# Patient Record
Sex: FEMALE | Race: WHITE | Employment: FULL TIME | URBAN - METROPOLITAN AREA
[De-identification: names, ages, dates, MRNs, and addresses within clinical notes are randomized per-mention and may not be internally consistent; named-entity substitution may affect disease eponyms.]

---

## 2017-03-27 ENCOUNTER — TRANSFERRED RECORDS (OUTPATIENT)
Dept: HEALTH INFORMATION MANAGEMENT | Facility: CLINIC | Age: 52
End: 2017-03-27

## 2017-08-08 DIAGNOSIS — L65.9 ALOPECIA: Primary | ICD-10-CM

## 2017-08-08 PROCEDURE — 36415 COLL VENOUS BLD VENIPUNCTURE: CPT

## 2017-08-08 PROCEDURE — 84443 ASSAY THYROID STIM HORMONE: CPT

## 2017-08-09 LAB — TSH SERPL DL<=0.005 MIU/L-ACNC: 1.32 MU/L (ref 0.4–4)

## 2017-11-06 DIAGNOSIS — I10 ESSENTIAL HYPERTENSION, BENIGN: ICD-10-CM

## 2017-11-06 DIAGNOSIS — F41.9 ANXIETY: ICD-10-CM

## 2017-11-07 RX ORDER — CITALOPRAM HYDROBROMIDE 20 MG/1
TABLET ORAL
Qty: 30 TABLET | Refills: 0 | Status: SHIPPED | OUTPATIENT
Start: 2017-11-07 | End: 2017-12-15

## 2017-11-07 RX ORDER — LOSARTAN POTASSIUM 50 MG/1
TABLET ORAL
Qty: 30 TABLET | Refills: 0 | Status: SHIPPED | OUTPATIENT
Start: 2017-11-07 | End: 2017-12-15

## 2017-12-13 NOTE — PROGRESS NOTES
"   SUBJECTIVE:   CC: Aracelis Merritt is an 52 year old woman who presents for preventive health visit.     Healthy Habits:    Do you get at least three servings of calcium containing foods daily (dairy, green leafy vegetables, etc.)? No    Amount of exercise or daily activities, outside of work: 2 day(s) per week    Problems taking medications regularly No    Medication side effects: No    Have you had an eye exam in the past two years? Yes     Do you see a dentist twice per year? Yes     Do you have sleep apnea, excessive snoring or daytime drowsiness? No     Aracelis has been well this past year; is a  still and is active at work. Has also started to work with a . The more she works out the more regular she is in terms of her bowel habits but she has some questions about that today. Feels more bloated now than she used to after dinner (not noticing it during the work day).  Bowel habits have \"always been really weird\"; does take Metamucil nightly and Senna with plenty of water. No period in 1.5 years and feels she has gained weight in her abdominal gut region.  Dermatologist put her on Latisse b/c loosing eyebrows - started on biotin too. There is also a cream she has been Rx in the past but unsure what it is.  Also dermatologist checked her thyroid and it was normal and may do steroid injections for hair loss.  Will check with [facility] on [address] as doesn't have much calcium in diet.    Today's PHQ-2 Score:   PHQ-2 ( 1999 Pfizer) 2/18/2016 1/21/2016   Q1: Little interest or pleasure in doing things 0 0   Q2: Feeling down, depressed or hopeless 0 0   PHQ-2 Score 0 0     Abuse: Current or Past(Physical, Sexual or Emotional)- No  Do you feel safe in your environment - Yes    Social History   Substance Use Topics     Smoking status: Former Smoker     Quit date: 8/20/2013     Smokeless tobacco: Never Used     Alcohol use 3.6 oz/week     6 Standard drinks or equivalent per week      " "Comment: 1 drink per week      If you drink alcohol do you typically have >3 drinks per day or >7 drinks per week? No                     Reviewed orders with patient.  Reviewed health maintenance and updated orders accordingly - Yes  Patient over age 50, mutual decision to screen reflected in health maintenance.  Pertinent mammograms are reviewed under the imaging tab.  History of abnormal Pap smear: NO - age 30- 65 PAP every 3 years or 5 years with cotesting recommended      ROS:  Comprehensive ROS negative unless as stated above in HPI.     OBJECTIVE:   /82 (BP Location: Right arm, Patient Position: Chair, Cuff Size: Adult Regular)  Pulse 66  Temp 97.8  F (36.6  C) (Tympanic)  Ht 5' 3\" (1.6 m)  Wt 140 lb (63.5 kg)  LMP 08/06/2015  SpO2 95%  BMI 24.8 kg/m2  EXAM:  GENERAL: healthy, alert and no distress  EYES: Eyes grossly normal to inspection, PERRL and conjunctivae and sclerae normal  HENT: ear canals and TM's normal, nose and mouth without ulcers or lesions  NECK: no adenopathy, no asymmetry, masses, or scars and thyroid normal to palpation  RESP: lungs clear to auscultation - no rales, rhonchi or wheezes  CV: regular rate and rhythm, normal S1 S2, no S3 or S4, no murmur, click or rub, no peripheral edema   ABDOMEN: soft, nontender, no hepatosplenomegaly, no masses and bowel sounds normal  MS: no gross musculoskeletal defects noted, no edema  SKIN: no suspicious lesions or rashes; no significant rashes or hair loss noted  NEURO: Normal strength and tone, mentation intact and speech normal  PSYCH: mentation appears normal, affect normal/bright    ASSESSMENT/PLAN:   1. Encounter for preventative adult health care exam with abnormal findings  Up to date on preventative health care and had mammogram today  Seems to be postmenopausal now  - Lipid panel reflex to direct LDL Fasting; Future    2. Anxiety  Stable on current medication  - citalopram (CELEXA) 20 MG tablet; Take 1 tablet (20 mg) by mouth " "daily  Dispense: 90 tablet; Refill: 3    3. Essential hypertension, benign  At goal  - losartan (COZAAR) 50 MG tablet; Take 1 tablet (50 mg) by mouth daily  Dispense: 90 tablet; Refill: 3    4. Flatulence, eructation, and gas pain  Will check some basic labs - h/o irregular bowels in the past   Up to date on colonoscopy  Consider gynecologic in origin and ordering pelvic US if symptoms persist or worsen  Her weight is stable and abdominal exam unremarkable today  - CBC with platelets; Future  - Comprehensive metabolic panel; Future  - Cortisol; Future  - Estradiol; Future  - Follicle stimulating hormone; Future  - Tissue transglutaminase horacio IgA and IgG; Future    5. Need for hepatitis C screening test  - Hepatitis C Screen Reflex to HCV RNA Quant and Genotype; Future    COUNSELING:   Reviewed preventive health counseling, as reflected in patient instructions       Regular exercise       Healthy diet/nutrition   reports that she quit smoking about 4 years ago. She has never used smokeless tobacco.  Estimated body mass index is 24.8 kg/(m^2) as calculated from the following:    Height as of this encounter: 5' 3\" (1.6 m).    Weight as of this encounter: 140 lb (63.5 kg).       Patient Instructions   Schedule future fasting labs  If you'd like, please write down the eye cream and let me know (drop it off at the lab visit)  Try Head & Shoulders shampoo occasionally  Follow up annually or as needed      Sana Cottrell, DO  [facility]  "

## 2017-12-13 NOTE — PATIENT INSTRUCTIONS
Schedule future fasting labs  If you'd like, please write down the eye cream and let me know (drop it off at the lab visit)  Try Head & Shoulders shampoo occasionally  Follow up annually or as needed    Preventive Health Recommendations  Female Ages 50 - 64    Yearly exam: See your health care provider every year in order to  o Review health changes.   o Discuss preventive care.    o Review your medicines if your doctor has prescribed any.      Get a Pap test every three years (unless you have an abnormal result and your provider advises testing more often).    If you get Pap tests with HPV test, you only need to test every 5 years, unless you have an abnormal result.     You do not need a Pap test if your uterus was removed (hysterectomy) and you have not had cancer.    You should be tested each year for STDs (sexually transmitted diseases) if you're at risk.     Have a mammogram every 1 to 2 years.    Have a colonoscopy at age 50, or have a yearly FIT test (stool test). These exams screen for colon cancer.      Have a cholesterol test every 5 years, or more often if advised.    Have a diabetes test (fasting glucose) every three years. If you are at risk for diabetes, you should have this test more often.     If you are at risk for osteoporosis (brittle bone disease), think about having a bone density scan (DEXA).    Shots: Get a flu shot each year. Get a tetanus shot every 10 years.    Nutrition:     Eat at least 5 servings of fruits and vegetables each day.    Eat whole-grain bread, whole-wheat pasta and brown rice instead of white grains and rice.    Talk to your provider about Calcium and Vitamin D.     Lifestyle    Exercise at least 150 minutes a week (30 minutes a day, 5 days a week). This will help you control your weight and prevent disease.    Limit alcohol to one drink per day.    No smoking.     Wear sunscreen to prevent skin cancer.     See your dentist every six months for an exam and cleaning.    See  your eye doctor every 1 to 2 years.

## 2017-12-15 ENCOUNTER — OFFICE VISIT (OUTPATIENT)
Dept: FAMILY MEDICINE | Facility: CLINIC | Age: 52
End: 2017-12-15
Payer: COMMERCIAL

## 2017-12-15 ENCOUNTER — HOSPITAL ENCOUNTER (OUTPATIENT)
Dept: MAMMOGRAPHY | Facility: CLINIC | Age: 52
End: 2017-12-15
Attending: INTERNAL MEDICINE
Payer: COMMERCIAL

## 2017-12-15 VITALS
DIASTOLIC BLOOD PRESSURE: 82 MMHG | WEIGHT: 140 LBS | SYSTOLIC BLOOD PRESSURE: 136 MMHG | HEIGHT: 63 IN | BODY MASS INDEX: 24.8 KG/M2 | HEART RATE: 66 BPM | OXYGEN SATURATION: 95 % | TEMPERATURE: 97.8 F

## 2017-12-15 DIAGNOSIS — R14.1 FLATULENCE, ERUCTATION, AND GAS PAIN: ICD-10-CM

## 2017-12-15 DIAGNOSIS — Z11.59 NEED FOR HEPATITIS C SCREENING TEST: ICD-10-CM

## 2017-12-15 DIAGNOSIS — Z00.00 PREVENTATIVE HEALTH CARE: ICD-10-CM

## 2017-12-15 DIAGNOSIS — R14.3 FLATULENCE, ERUCTATION, AND GAS PAIN: ICD-10-CM

## 2017-12-15 DIAGNOSIS — R14.2 FLATULENCE, ERUCTATION, AND GAS PAIN: ICD-10-CM

## 2017-12-15 DIAGNOSIS — I10 ESSENTIAL HYPERTENSION, BENIGN: ICD-10-CM

## 2017-12-15 DIAGNOSIS — F41.9 ANXIETY: ICD-10-CM

## 2017-12-15 DIAGNOSIS — Z00.01 ENCOUNTER FOR PREVENTATIVE ADULT HEALTH CARE EXAM WITH ABNORMAL FINDINGS: Primary | ICD-10-CM

## 2017-12-15 PROCEDURE — 99396 PREV VISIT EST AGE 40-64: CPT | Performed by: INTERNAL MEDICINE

## 2017-12-15 PROCEDURE — G0202 SCR MAMMO BI INCL CAD: HCPCS

## 2017-12-15 RX ORDER — LOSARTAN POTASSIUM 50 MG/1
50 TABLET ORAL DAILY
Qty: 90 TABLET | Refills: 3 | Status: SHIPPED | OUTPATIENT
Start: 2017-12-15 | End: 2018-11-21

## 2017-12-15 RX ORDER — CITALOPRAM HYDROBROMIDE 20 MG/1
20 TABLET ORAL DAILY
Qty: 90 TABLET | Refills: 3 | Status: SHIPPED | OUTPATIENT
Start: 2017-12-15 | End: 2018-11-21

## 2017-12-15 NOTE — NURSING NOTE
"Chief Complaint   Patient presents with     Physical        Initial /82 (BP Location: Right arm, Patient Position: Chair, Cuff Size: Adult Regular)  Pulse 66  Temp 97.8  F (36.6  C) (Tympanic)  Ht 5' 3\" (1.6 m)  Wt 140 lb (63.5 kg)  LMP 08/06/2015  SpO2 95%  BMI 24.8 kg/m2 Estimated body mass index is 24.8 kg/(m^2) as calculated from the following:    Height as of this encounter: 5' 3\" (1.6 m).    Weight as of this encounter: 140 lb (63.5 kg)..    BP completed using cuff size: regular  MEDICATIONS REVIEWED  SOCIAL AND FAMILY HX REVIEWED  Daniella Morrissey CMA  "

## 2017-12-15 NOTE — MR AVS SNAPSHOT
After Visit Summary   12/15/2017    Aracelis Merritt    MRN: [id number]           Patient Information     Date Of Birth          1965        Visit Information        Provider Department      12/15/2017 3:00 PM Sana Cottrell,  [facility]        Today's Diagnoses     Encounter for preventative adult health care exam with abnormal findings    -  1    Anxiety        Essential hypertension, benign        Flatulence, eructation, and gas pain        Need for hepatitis C screening test          Care Instructions    Schedule future fasting labs  If you'd like, please write down the eye cream and let me know (drop it off at the lab visit)  Try Head & Shoulders shampoo occasionally  Follow up annually or as needed    Preventive Health Recommendations  Female Ages 50 - 64    Yearly exam: See your health care provider every year in order to  o Review health changes.   o Discuss preventive care.    o Review your medicines if your doctor has prescribed any.      Get a Pap test every three years (unless you have an abnormal result and your provider advises testing more often).    If you get Pap tests with HPV test, you only need to test every 5 years, unless you have an abnormal result.     You do not need a Pap test if your uterus was removed (hysterectomy) and you have not had cancer.    You should be tested each year for STDs (sexually transmitted diseases) if you're at risk.     Have a mammogram every 1 to 2 years.    Have a colonoscopy at age 50, or have a yearly FIT test (stool test). These exams screen for colon cancer.      Have a cholesterol test every 5 years, or more often if advised.    Have a diabetes test (fasting glucose) every three years. If you are at risk for diabetes, you should have this test more often.     If you are at risk for osteoporosis (brittle bone disease), think about having a bone density scan (DEXA).    Shots: Get a flu shot each year. Get a tetanus shot every 10  years.    Nutrition:     Eat at least 5 servings of fruits and vegetables each day.    Eat whole-grain bread, whole-wheat pasta and brown rice instead of white grains and rice.    Talk to your provider about Calcium and Vitamin D.     Lifestyle    Exercise at least 150 minutes a week (30 minutes a day, 5 days a week). This will help you control your weight and prevent disease.    Limit alcohol to one drink per day.    No smoking.     Wear sunscreen to prevent skin cancer.     See your dentist every six months for an exam and cleaning.    See your eye doctor every 1 to 2 years.            Follow-ups after your visit        Future tests that were ordered for you today     Open Future Orders        Priority Expected Expires Ordered    CBC with platelets Routine 12/18/2017 7/15/2018 12/15/2017    Comprehensive metabolic panel Routine 12/18/2017 7/15/2018 12/15/2017    Lipid panel reflex to direct LDL Fasting Routine 12/18/2017 7/15/2018 12/15/2017    Cortisol Routine 12/18/2017 7/15/2018 12/15/2017    Estradiol Routine 12/18/2017 7/15/2018 12/15/2017    Follicle stimulating hormone Routine 12/18/2017 7/15/2018 12/15/2017    Tissue transglutaminase horacio IgA and IgG Routine 12/18/2017 7/15/2018 12/15/2017    Hepatitis C Screen Reflex to HCV RNA Quant and Genotype Routine 12/18/2017 7/15/2018 12/15/2017            Who to contact     If you have questions or need follow up information about today's clinic visit or your schedule please contact [facility] directly at [phone].  Normal or non-critical lab and imaging results will be communicated to you by MyChart, letter or phone within 4 business days after the clinic has received the results. If you do not hear from us within 7 days, please contact the clinic through MyChart or phone. If you have a critical or abnormal lab result, we will notify you by phone as soon as possible.  Submit refill requests through [employer] or call your pharmacy and they will  "forward the refill request to us. Please allow 3 business days for your refill to be completed.          Additional Information About Your Visit        Realitycheck[employer]Notegraphy Information     S[employer] lets you send messages to your doctor, view your test results, renew your prescriptions, schedule appointments and more. To sign up, go to [email or web address] . Click on \"Log in\" on the left side of the screen, which will take you to the Welcome page. Then click on \"Sign up Now\" on the right side of the page.     You will be asked to enter the access code listed below, as well as some personal information. Please follow the directions to create your username and password.     Your access code is: [email or web address]  Expires: 3/15/2018  4:08 PM     Your access code will  in 90 days. If you need help or a new code, please call your [facility] clinic or [phone].        Care EveryWhere ID     This is your Care EveryWhere ID. This could be used by other organizations to access your [facility] medical records  [id number]        Your Vitals Were     Pulse Temperature Height Last Period Pulse Oximetry BMI (Body Mass Index)    66 97.8  F (36.6  C) (Tympanic) 5' 3\" (1.6 m) 2015 95% 24.8 kg/m2       Blood Pressure from Last 3 Encounters:   12/15/17 136/82   16 137/89   16 141/90    Weight from Last 3 Encounters:   12/15/17 140 lb (63.5 kg)   16 139 lb (63 kg)   16 140 lb 3.2 oz (63.6 kg)                 Today's Medication Changes          These changes are accurate as of: 12/15/17  4:08 PM.  If you have any questions, ask your nurse or doctor.               These medicines have changed or have updated prescriptions.        Dose/Directions    citalopram 20 MG tablet   Commonly known as:  celeXA   This may have changed:  See the new instructions.   Used for:  Anxiety   Changed by:  Sana Cottrell,         Dose:  20 mg   Take 1 tablet (20 mg) by mouth daily   Quantity:  90 tablet   Refills:  3       losartan " 50 MG tablet   Commonly known as:  COZAAR   This may have changed:  See the new instructions.   Used for:  Essential hypertension, benign   Changed by:  Sana Cottrell DO        Dose:  50 mg   Take 1 tablet (50 mg) by mouth daily   Quantity:  90 tablet   Refills:  3            Where to get your medicines      Some of these will need a paper prescription and others can be bought over the counter.  Ask your nurse if you have questions.     Bring a paper prescription for each of these medications     citalopram 20 MG tablet    losartan 50 MG tablet                Primary Care Provider Office Phone # Fax #    Sana Cottrell [phone] [address]  [facility] [address]        Equal Access to Services     [facility] : Hadii chaparrita barber hadasho Soomaali, waaxda luqadaha, qaybta kaalmada adeegyada, marichuy lozano . So [facility] [phone].    ATENCIÓN: Si habla español, tiene a waddell disposición servicios gratuitos de asistencia lingüística. Llame al [phone].    We comply with applicable federal civil rights laws and [place] laws. We do not discriminate on the basis of race, color, national origin, age, disability, sex, sexual orientation, or gender identity.            Thank you!     Thank you for choosing [facility]  for your care. Our goal is always to provide you with excellent care. Hearing back from our patients is one way we can continue to improve our services. Please take a few minutes to complete the written survey that you may receive in the mail after your visit with us. Thank you!             Your Updated Medication List - Protect others around you: Learn how to safely use, store and throw away your medicines at www.disposemymeds.org.          This list is accurate as of: 12/15/17  4:08 PM.  Always use your most recent med list.                   Brand Name Dispense Instructions for use Diagnosis    citalopram 20 MG tablet    celeXA     90 tablet    Take 1 tablet (20 mg) by mouth daily    Anxiety       losartan 50 MG tablet    COZAAR    90 tablet    Take 1 tablet (50 mg) by mouth daily    Essential hypertension, benign       METAMUCIL 28.3 % Powd   Generic drug:  psyllium      Take 1 teaspoonful by mouth 2 times daily        VITAMIN B 12 PO      Take 1,000 mcg by mouth        VITAMIN D (CHOLECALCIFEROL) PO      Take 1,000 Units by mouth daily

## 2017-12-27 DIAGNOSIS — Z11.59 NEED FOR HEPATITIS C SCREENING TEST: ICD-10-CM

## 2017-12-27 DIAGNOSIS — R14.2 FLATULENCE, ERUCTATION, AND GAS PAIN: ICD-10-CM

## 2017-12-27 DIAGNOSIS — R14.1 FLATULENCE, ERUCTATION, AND GAS PAIN: ICD-10-CM

## 2017-12-27 DIAGNOSIS — R14.3 FLATULENCE, ERUCTATION, AND GAS PAIN: ICD-10-CM

## 2017-12-27 DIAGNOSIS — Z00.01 ENCOUNTER FOR PREVENTATIVE ADULT HEALTH CARE EXAM WITH ABNORMAL FINDINGS: ICD-10-CM

## 2017-12-27 LAB
CORTIS SERPL-MCNC: 15.9 UG/DL (ref 4–22)
ERYTHROCYTE [DISTWIDTH] IN BLOOD BY AUTOMATED COUNT: 13.8 % (ref 10–15)
ESTRADIOL SERPL-MCNC: 109 PG/ML
FSH SERPL-ACNC: 64.8 IU/L
HCT VFR BLD AUTO: 44.3 % (ref 35–47)
HGB BLD-MCNC: 14.8 G/DL (ref 11.7–15.7)
MCH RBC QN AUTO: 30 PG (ref 26.5–33)
MCHC RBC AUTO-ENTMCNC: 33.4 G/DL (ref 31.5–36.5)
MCV RBC AUTO: 90 FL (ref 78–100)
PLATELET # BLD AUTO: 143 10E9/L (ref 150–450)
RBC # BLD AUTO: 4.94 10E12/L (ref 3.8–5.2)
WBC # BLD AUTO: 3 10E9/L (ref 4–11)

## 2017-12-27 PROCEDURE — 83001 ASSAY OF GONADOTROPIN (FSH): CPT | Performed by: INTERNAL MEDICINE

## 2017-12-27 PROCEDURE — 80061 LIPID PANEL: CPT | Performed by: INTERNAL MEDICINE

## 2017-12-27 PROCEDURE — 82533 TOTAL CORTISOL: CPT | Performed by: INTERNAL MEDICINE

## 2017-12-27 PROCEDURE — 82670 ASSAY OF TOTAL ESTRADIOL: CPT | Performed by: INTERNAL MEDICINE

## 2017-12-27 PROCEDURE — 83516 IMMUNOASSAY NONANTIBODY: CPT | Performed by: INTERNAL MEDICINE

## 2017-12-27 PROCEDURE — 83516 IMMUNOASSAY NONANTIBODY: CPT | Mod: 59 | Performed by: INTERNAL MEDICINE

## 2017-12-27 PROCEDURE — 86803 HEPATITIS C AB TEST: CPT | Performed by: INTERNAL MEDICINE

## 2017-12-27 PROCEDURE — 80053 COMPREHEN METABOLIC PANEL: CPT | Performed by: INTERNAL MEDICINE

## 2017-12-27 PROCEDURE — 36415 COLL VENOUS BLD VENIPUNCTURE: CPT | Performed by: INTERNAL MEDICINE

## 2017-12-27 PROCEDURE — 85027 COMPLETE CBC AUTOMATED: CPT | Performed by: INTERNAL MEDICINE

## 2017-12-27 NOTE — MR AVS SNAPSHOT
"              After Visit Summary   12/27/2017    Aracelis Merritt    MRN: [id number]           Patient Information     Date Of Birth          1965        Visit Information        Provider Department      12/27/2017 8:30 AM CS LAB [facility]         Follow-ups after your visit        Your next 10 appointments already scheduled     Dec 27, 2017  8:30 AM CST   LAB with CS LAB   [facility] ([facility]    [address] [facility] [address] [phone]           Please do not eat 10-12 hours before your appointment if you are coming in fasting for labs on lipids, cholesterol, or glucose (sugar). This does not apply to pregnant women. Water, hot tea and black coffee (with nothing added) are okay. Do not drink other fluids, diet soda or chew gum.              Who to contact     If you have questions or need follow up information about today's clinic visit or your schedule please contact [facility] directly at [phone].  Normal or non-critical lab and imaging results will be communicated to you by [employer] Pharm[employer]shart, letter or phone within 4 business days after the clinic has received the results. If you do not hear from us within 7 days, please contact the clinic through [employer] or phone. If you have a critical or abnormal lab result, we will notify you by phone as soon as possible.  Submit refill requests through [employer] or call your pharmacy and they will forward the refill request to us. Please allow 3 business days for your refill to be completed.          Additional Information About Your Visit        Abs[employer]utData Information     [employer] lets you send messages to your doctor, view your test results, renew your prescriptions, schedule appointments and more. To sign up, go to [email or web address] . Click on \"Log in\" on the left side of the screen, which will take you to the Welcome page. Then click on \"Sign up Now\" on the right side of the page.     You " will be asked to enter the access code listed below, as well as some personal information. Please follow the directions to create your username and password.     Your access code is: [email or web address]  Expires: 3/15/2018  4:08 PM     Your access code will  in 90 days. If you need help or a new code, please call your [place] clinic or [phone].        Care EveryWhere ID     This is your Care EveryWhere ID. This could be used by other organizations to access your [facility] medical records  [id number]        Your Vitals Were     Last Period                   2015            Blood Pressure from Last 3 Encounters:   12/15/17 136/82   16 137/89   16 141/90    Weight from Last 3 Encounters:   12/15/17 140 lb (63.5 kg)   16 139 lb (63 kg)   16 140 lb 3.2 oz (63.6 kg)              Today, you had the following     No orders found for display       Primary Care Provider Office Phone # Fax #    Sana Lepe DO Ron[place] [phone] [address] [facility] [address]  [facility] [address]        Equal Access to Services     [facility] : Hadii aad ku hadasho Solaureanoali, waaxda luqadaha, qaybta kaalmada adeegyada, marichuy germainn erinn lozano . So [facility] [phone].    ATENCIÓN: Si habla español, tiene a waddell disposición servicios gratuitos de asistencia lingüística. LlSelect [facility] [place] [phone].    We comply with applicable federal civil rights laws and [place] laws. We do not discriminate on the basis of race, color, national origin, age, disability, sex, sexual orientation, or gender identity.            Thank you!     Thank you for choosing [facility]  for your care. Our goal is always to provide you with excellent care. Hearing back from our patients is one way we can continue to improve our services. Please take a few minutes to complete the written survey that you may receive in the mail after your visit with us. Thank you!             Your Updated Medication List -  Protect others around you: Learn how to safely use, store and throw away your medicines at www.disposemymeds.org.          This list is accurate as of: 12/27/17  8:15 AM.  Always use your most recent med list.                   Brand Name Dispense Instructions for use Diagnosis    citalopram 20 MG tablet    celeXA    90 tablet    Take 1 tablet (20 mg) by mouth daily    Anxiety       losartan 50 MG tablet    COZAAR    90 tablet    Take 1 tablet (50 mg) by mouth daily    Essential hypertension, benign       METAMUCIL 28.3 % Powd   Generic drug:  psyllium      Take 1 teaspoonful by mouth 2 times daily        VITAMIN B 12 PO      Take 1,000 mcg by mouth        VITAMIN D (CHOLECALCIFEROL) PO      Take 1,000 Units by mouth daily

## 2017-12-27 NOTE — LETTER
[facility]  [address] [facility]  [address]  [place]  [address]  Tel: [phone]    January 2, 2018    Aracelis Merritt  [address] [facility] [address]        Dear MsBette René,    It was nice to see you recently. Hope you had a nice holiday season and time off of work! Thank you for coming back in for your labs.  The Celiac Disease screening test for glutin intolerance (tissue transglutaminase) is normal. Your hormone levels are fluctuating a bit at this time of your life. If you continue to have the bloating, I'd definitely recommend what's called a pelvic ultrasound to look at the ovaries so please call us if you wish us to help set this up for you.  Your complete blood count was slightly abnormal with slightly low white blood cells and platelets. This is non-specific. Were you coming down with a cold when you came back for your lab work?  While your cholesterol is slightly elevated, it is not yet to the degree that the American College of Cardiology and the American Heart Association would recommend starting a prescription cholesterol medication. Please continue to work on eating a balanced diet with exercise as able and this should be rechecked next year.  Your fasting glucose is normal; thus there is no evidence of diabetes at this time.  Your kidney function (GFR) is normal. Liver testing (AST, ALT, alkaline phosphatase and bilirubin) is normal. Your hepatitis C screening test is normal.  After you have had a chance to review the above recommendations, please call us for an update or schedule a follow up appointment with me to discuss next steps.    Sincerely,    Sana Cottrell, /SML          Enclosure: Lab Results  Results for orders placed or performed in visit on 12/27/17   CBC with platelets   Result Value Ref Range    WBC 3.0 (L) 4.0 - 11.0 10e9/L    RBC Count 4.94 3.8 - 5.2 10e12/L    Hemoglobin 14.8 11.7 - 15.7 g/dL    Hematocrit 44.3 35.0 - 47.0 %    MCV 90 78 - 100 fl    MCH  30.0 26.5 - 33.0 pg    MCHC 33.4 31.5 - 36.5 g/dL    RDW 13.8 10.0 - 15.0 %    Platelet Count 143 (L) 150 - 450 10e9/L   Comprehensive metabolic panel   Result Value Ref Range    Sodium 139 133 - 144 mmol/L    Potassium 3.7 3.4 - 5.3 mmol/L    Chloride 106 94 - 109 mmol/L    Carbon Dioxide 30 20 - 32 mmol/L    Anion Gap 3 3 - 14 mmol/L    Glucose 96 70 - 99 mg/dL    Urea Nitrogen 15 7 - 30 mg/dL    Creatinine 0.61 0.52 - 1.04 mg/dL    GFR Estimate >90 >60 mL/min/1.7m2    GFR Estimate If Black >90 >60 mL/min/1.7m2    Calcium 8.4 (L) 8.5 - 10.1 mg/dL    Bilirubin Total 0.5 0.2 - 1.3 mg/dL    Albumin 3.9 3.4 - 5.0 g/dL    Protein Total 7.0 6.8 - 8.8 g/dL    Alkaline Phosphatase 70 40 - 150 U/L    ALT 26 0 - 50 U/L    AST 15 0 - 45 U/L   Lipid panel reflex to direct LDL Fasting   Result Value Ref Range    Cholesterol 265 (H) <200 mg/dL    Triglycerides 231 (H) <150 mg/dL    HDL Cholesterol 49 (L) >49 mg/dL    LDL Cholesterol Calculated 170 (H) <100 mg/dL    Non HDL Cholesterol 216 (H) <130 mg/dL   Cortisol   Result Value Ref Range    Cortisol Serum 15.9 4 - 22 ug/dL   Estradiol   Result Value Ref Range    Estradiol 109 pg/mL   Follicle stimulating hormone   Result Value Ref Range    FSH 64.8 IU/L   Tissue transglutaminase horacio IgA and IgG   Result Value Ref Range    Tissue Transglutaminase Antibody IgA <1 <7 U/mL    Tissue Transglutaminase Horacio IgG <1 <7 U/mL   Hepatitis C Screen Reflex to HCV RNA Quant and Genotype   Result Value Ref Range    Hepatitis C Antibody Nonreactive NR^Nonreactive

## 2017-12-28 LAB
ALBUMIN SERPL-MCNC: 3.9 G/DL (ref 3.4–5)
ALP SERPL-CCNC: 70 U/L (ref 40–150)
ALT SERPL W P-5'-P-CCNC: 26 U/L (ref 0–50)
ANION GAP SERPL CALCULATED.3IONS-SCNC: 3 MMOL/L (ref 3–14)
AST SERPL W P-5'-P-CCNC: 15 U/L (ref 0–45)
BILIRUB SERPL-MCNC: 0.5 MG/DL (ref 0.2–1.3)
BUN SERPL-MCNC: 15 MG/DL (ref 7–30)
CALCIUM SERPL-MCNC: 8.4 MG/DL (ref 8.5–10.1)
CHLORIDE SERPL-SCNC: 106 MMOL/L (ref 94–109)
CHOLEST SERPL-MCNC: 265 MG/DL
CO2 SERPL-SCNC: 30 MMOL/L (ref 20–32)
CREAT SERPL-MCNC: 0.61 MG/DL (ref 0.52–1.04)
GFR SERPL CREATININE-BSD FRML MDRD: >90 ML/MIN/1.7M2
GLUCOSE SERPL-MCNC: 96 MG/DL (ref 70–99)
HCV AB SERPL QL IA: NONREACTIVE
HDLC SERPL-MCNC: 49 MG/DL
LDLC SERPL CALC-MCNC: 170 MG/DL
NONHDLC SERPL-MCNC: 216 MG/DL
POTASSIUM SERPL-SCNC: 3.7 MMOL/L (ref 3.4–5.3)
PROT SERPL-MCNC: 7 G/DL (ref 6.8–8.8)
SODIUM SERPL-SCNC: 139 MMOL/L (ref 133–144)
TRIGL SERPL-MCNC: 231 MG/DL

## 2017-12-29 LAB
TTG IGA SER-ACNC: <1 U/ML
TTG IGG SER-ACNC: <1 U/ML

## 2018-01-01 PROBLEM — E78.2 MIXED HYPERLIPIDEMIA: Chronic | Status: ACTIVE | Noted: 2018-01-01

## 2018-01-01 NOTE — PROGRESS NOTES
Please send a copy of their test results and a letter to the patient as follows. Thanks!  Aracelis,  It was nice to see you recently. Hope you had a nice holiday season and time off of work! Thank you for coming back in for your labs.  The Celiac Disease screening test for glutin intolerance (tissue transglutaminase) is normal. Your hormone levels are fluctuating a bit at this time of your life. If you continue to have the bloating, I'd definitely recommend what's called a pelvic ultrasound to look at the ovaries so please call us if you wish us to help set this up for you.  Your complete blood count was slightly abnormal with slightly low white blood cells and platelets. This is non-specific. Were you coming down with a cold when you came back for your lab work?  While your cholesterol is slightly elevated, it is not yet to the degree that the American College of Cardiology and the American Heart Association would recommend starting a prescription cholesterol medication. Please continue to work on eating a balanced diet with exercise as able and this should be rechecked next year.  Your fasting glucose is normal; thus there is no evidence of diabetes at this time.  Your kidney function (GFR) is normal. Liver testing (AST, ALT, alkaline phosphatase and bilirubin) is normal. Your hepatitis C screening test is normal.  After you have had a chance to review the above recommendations, please call us for an update or schedule a follow up appointment with me to discuss next steps.  Thanks!

## 2018-10-20 ENCOUNTER — HEALTH MAINTENANCE LETTER (OUTPATIENT)
Age: 53
End: 2018-10-20

## 2018-11-01 ENCOUNTER — TELEPHONE (OUTPATIENT)
Dept: FAMILY MEDICINE | Facility: CLINIC | Age: 53
End: 2018-11-01

## 2018-11-01 NOTE — TELEPHONE ENCOUNTER
Reason for Call:  Other appointment    Detailed comments: Per patient: Is there anyway I can get squeezed in for my annual physical 11/21 or 11/23, anytime works for me.     Phone Number Patient can be reached at: Home number on file [phone] (home)    Best Time: Anytime    Can we leave a detailed message on this number? YES    Call taken on 11/1/2018 at 4:26 PM by Konrad Franklin

## 2018-11-01 NOTE — TELEPHONE ENCOUNTER
I contacted pt and was able to work her in on the dates requested.  Pt wants to know if Dr Cottrell would still be willing to order the pelvic ultrasound as she is still having the bloating issues a year later.  Please advise and contact pt to let her know.

## 2018-11-21 ENCOUNTER — OFFICE VISIT (OUTPATIENT)
Dept: FAMILY MEDICINE | Facility: CLINIC | Age: 53
End: 2018-11-21
Payer: COMMERCIAL

## 2018-11-21 VITALS
BODY MASS INDEX: 23.04 KG/M2 | HEART RATE: 63 BPM | SYSTOLIC BLOOD PRESSURE: 124 MMHG | DIASTOLIC BLOOD PRESSURE: 83 MMHG | OXYGEN SATURATION: 96 % | TEMPERATURE: 98.4 F | HEIGHT: 63 IN | WEIGHT: 130 LBS

## 2018-11-21 DIAGNOSIS — I10 ESSENTIAL HYPERTENSION, BENIGN: Chronic | ICD-10-CM

## 2018-11-21 DIAGNOSIS — F41.9 ANXIETY: ICD-10-CM

## 2018-11-21 DIAGNOSIS — Z00.01 ENCOUNTER FOR PREVENTATIVE ADULT HEALTH CARE EXAM WITH ABNORMAL FINDINGS: Primary | ICD-10-CM

## 2018-11-21 DIAGNOSIS — R14.0 ABDOMINAL BLOATING: ICD-10-CM

## 2018-11-21 DIAGNOSIS — Z12.4 SCREENING FOR CERVICAL CANCER: ICD-10-CM

## 2018-11-21 DIAGNOSIS — E78.2 MIXED HYPERLIPIDEMIA: Chronic | ICD-10-CM

## 2018-11-21 LAB
ALBUMIN SERPL-MCNC: 3.9 G/DL (ref 3.4–5)
ALP SERPL-CCNC: 72 U/L (ref 40–150)
ALT SERPL W P-5'-P-CCNC: 36 U/L (ref 0–50)
ANION GAP SERPL CALCULATED.3IONS-SCNC: 8 MMOL/L (ref 3–14)
AST SERPL W P-5'-P-CCNC: 22 U/L (ref 0–45)
BILIRUB SERPL-MCNC: 0.5 MG/DL (ref 0.2–1.3)
BUN SERPL-MCNC: 14 MG/DL (ref 7–30)
CALCIUM SERPL-MCNC: 9 MG/DL (ref 8.5–10.1)
CHLORIDE SERPL-SCNC: 106 MMOL/L (ref 94–109)
CHOLEST SERPL-MCNC: 204 MG/DL
CO2 SERPL-SCNC: 26 MMOL/L (ref 20–32)
CREAT SERPL-MCNC: 0.63 MG/DL (ref 0.52–1.04)
ERYTHROCYTE [DISTWIDTH] IN BLOOD BY AUTOMATED COUNT: 12.7 % (ref 10–15)
GFR SERPL CREATININE-BSD FRML MDRD: >90 ML/MIN/1.7M2
GLUCOSE SERPL-MCNC: 87 MG/DL (ref 70–99)
HCT VFR BLD AUTO: 44.8 % (ref 35–47)
HDLC SERPL-MCNC: 43 MG/DL
HGB BLD-MCNC: 14.5 G/DL (ref 11.7–15.7)
LDLC SERPL CALC-MCNC: 144 MG/DL
MCH RBC QN AUTO: 29.2 PG (ref 26.5–33)
MCHC RBC AUTO-ENTMCNC: 32.4 G/DL (ref 31.5–36.5)
MCV RBC AUTO: 90 FL (ref 78–100)
NONHDLC SERPL-MCNC: 161 MG/DL
PLATELET # BLD AUTO: 132 10E9/L (ref 150–450)
POTASSIUM SERPL-SCNC: 4.1 MMOL/L (ref 3.4–5.3)
PROT SERPL-MCNC: 7.1 G/DL (ref 6.8–8.8)
RBC # BLD AUTO: 4.96 10E12/L (ref 3.8–5.2)
SODIUM SERPL-SCNC: 140 MMOL/L (ref 133–144)
TRIGL SERPL-MCNC: 86 MG/DL
WBC # BLD AUTO: 4.5 10E9/L (ref 4–11)

## 2018-11-21 PROCEDURE — 99396 PREV VISIT EST AGE 40-64: CPT | Performed by: INTERNAL MEDICINE

## 2018-11-21 PROCEDURE — 99213 OFFICE O/P EST LOW 20 MIN: CPT | Mod: 25 | Performed by: INTERNAL MEDICINE

## 2018-11-21 PROCEDURE — 85027 COMPLETE CBC AUTOMATED: CPT | Performed by: INTERNAL MEDICINE

## 2018-11-21 PROCEDURE — 80061 LIPID PANEL: CPT | Performed by: INTERNAL MEDICINE

## 2018-11-21 PROCEDURE — 36415 COLL VENOUS BLD VENIPUNCTURE: CPT | Performed by: INTERNAL MEDICINE

## 2018-11-21 PROCEDURE — 87624 HPV HI-RISK TYP POOLED RSLT: CPT | Performed by: INTERNAL MEDICINE

## 2018-11-21 PROCEDURE — 80053 COMPREHEN METABOLIC PANEL: CPT | Performed by: INTERNAL MEDICINE

## 2018-11-21 RX ORDER — LOSARTAN POTASSIUM 50 MG/1
50 TABLET ORAL DAILY
Qty: 90 TABLET | Refills: 3 | Status: SHIPPED | OUTPATIENT
Start: 2018-11-21 | End: 2019-12-23

## 2018-11-21 RX ORDER — CITALOPRAM HYDROBROMIDE 20 MG/1
20 TABLET ORAL DAILY
Qty: 90 TABLET | Refills: 3 | Status: SHIPPED | OUTPATIENT
Start: 2018-11-21 | End: 2019-06-19 | Stop reason: ALTCHOICE

## 2018-11-21 RX ORDER — NICOTINE 14MG/24HR
PATCH, TRANSDERMAL 24 HOURS TRANSDERMAL DAILY PRN
COMMUNITY
End: 2022-01-17

## 2018-11-21 NOTE — MR AVS SNAPSHOT
After Visit Summary   11/21/2018    Aracelis Merritt    MRN: [id number]           Patient Information     Date Of Birth          1965        Visit Information        Provider Department      11/21/2018 12:00 PM Sana Cottrell,  [facility]        Today's Diagnoses     Encounter for preventative adult health care exam with abnormal findings    -  1    Essential hypertension, benign        Mixed hyperlipidemia        Anxiety        Abdominal bloating        Screening for cervical cancer          Care Instructions    Labs today  Future CT scan and possible referral to GI physician  Future mammogram - [facility]: [phone] in [address] downstairs    Preventive Health Recommendations  Female Ages 50 - 64    Yearly exam: See your health care provider every year in order to  o Review health changes.   o Discuss preventive care.    o Review your medicines if your doctor has prescribed any.      Get a Pap test every three years (unless you have an abnormal result and your provider advises testing more often).    If you get Pap tests with HPV test, you only need to test every 5 years, unless you have an abnormal result.     You do not need a Pap test if your uterus was removed (hysterectomy) and you have not had cancer.    You should be tested each year for STDs (sexually transmitted diseases) if you're at risk.     Have a mammogram every 1 to 2 years.    Have a colonoscopy at age 50, or have a yearly FIT test (stool test). These exams screen for colon cancer.      Have a cholesterol test every 5 years, or more often if advised.    Have a diabetes test (fasting glucose) every three years. If you are at risk for diabetes, you should have this test more often.     If you are at risk for osteoporosis (brittle bone disease), think about having a bone density scan (DEXA).    Shots: Get a flu shot each year. Get a tetanus shot every 10 years.    Nutrition:     Eat at  least 5 servings of fruits and vegetables each day.    Eat whole-grain bread, whole-wheat pasta and brown rice instead of white grains and rice.    Get adequate Calcium and Vitamin D.     Lifestyle    Exercise at least 150 minutes a week (30 minutes a day, 5 days a week). This will help you control your weight and prevent disease.    Limit alcohol to one drink per day.    No smoking.     Wear sunscreen to prevent skin cancer.     See your dentist every six months for an exam and cleaning.    See your eye doctor every 1 to 2 years.            Follow-ups after your visit        Future tests that were ordered for you today     Open Future Orders        Priority Expected Expires Ordered    CT Abdomen Pelvis w Contrast Routine  11/21/2019 11/21/2018            Who to contact     If you have questions or need follow up information about today's clinic visit or your schedule please contact [facility] directly at [phone].  Normal or non-critical lab and imaging results will be communicated to you by WhatSalonhart, letter or phone within 4 business days after the clinic has received the results. If you do not hear from us within 7 days, please contact the clinic through mobiTerist or phone. If you have a critical or abnormal lab result, we will notify you by phone as soon as possible.  Submit refill requests through Pingboard or call your pharmacy and they will forward the refill request to us. Please allow 3 business days for your refill to be completed.          Additional Information About Your Visit        WhatSalonhart Information     Pingboard gives you secure access to your electronic health record. If you see a primary care provider, you can also send messages to your care team and make appointments. If you have questions, please call your primary care clinic.  If you do not have a primary care provider, please call [phone] and they will assist you.        Care EveryWhere ID     This is your Care EveryWhere ID.  "This could be used by other organizations to access your [facility] medical records  [id number]        Your Vitals Were     Pulse Temperature Height Last Period Pulse Oximetry Breastfeeding?    63 98.4  F (36.9  C) (Oral) 5' 3\" (1.6 m) 08/06/2015 96% No    BMI (Body Mass Index)                   23.03 kg/m2            Blood Pressure from Last 3 Encounters:   11/21/18 124/83   12/15/17 136/82   09/20/16 137/89    Weight from Last 3 Encounters:   11/21/18 130 lb (59 kg)   12/15/17 140 lb (63.5 kg)   09/20/16 139 lb (63 kg)              We Performed the Following     CBC with platelets     Comprehensive metabolic panel     HPV High Risk Types DNA Cervical     Lipid panel reflex to direct LDL Fasting     Pap imaged thin layer screen with HPV - recommended age 30 - 65 years (select HPV order below)          Where to get your medicines      These medications were sent to [id number] - [address]  [address] [place] [address]     Phone:  [phone]     citalopram 20 MG tablet    losartan 50 MG tablet          Primary Care Provider Office Phone # Fax #    Sana Lepe DO Ron[place] [phone] [address] [address]        Equal Access to Services     RYAN FISH : Hadii aad ku hadasho Soomaali, waaxda luqadaha, qaybta kaalmada adeegyada, marichuy chen. So [facility] [phone].    ATENCIÓN: Si habla español, tiene a waddell disposición servicios gratuitos de asistencia lingüística. Ko al [phone].    We comply with applicable federal civil rights laws and [place] laws. We do not discriminate on the basis of race, color, national origin, age, disability, sex, sexual orientation, or gender identity.            Thank you!     Thank you for choosing [facility]  for your care. Our goal is always to provide you with excellent care. Hearing back from our patients is one way we can continue to improve our " services. Please take a few minutes to complete the written survey that you may receive in the mail after your visit with us. Thank you!             Your Updated Medication List - Protect others around you: Learn how to safely use, store and throw away your medicines at www.disposemymeds.org.          This list is accurate as of 11/21/18  1:21 PM.  Always use your most recent med list.                   Brand Name Dispense Instructions for use Diagnosis    citalopram 20 MG tablet    celeXA    90 tablet    Take 1 tablet (20 mg) by mouth daily    Anxiety       Collagen 500 MG Caps      Take 1 Dose by mouth daily        Glutamine 5 g Pack      Take 5 g by mouth daily        losartan 50 MG tablet    COZAAR    90 tablet    Take 1 tablet (50 mg) by mouth daily    Essential hypertension, benign       METAMUCIL 28.3 % Powd   Generic drug:  psyllium      Take 1 teaspoonful by mouth 2 times daily        Probiotic 250 MG Caps      Take by mouth daily as needed (30 Billion CFU's)        VITAMIN B 12 PO      Take 1,000 mcg by mouth        VITAMIN D (CHOLECALCIFEROL) PO      Take 1,000 Units by mouth daily

## 2018-11-21 NOTE — PROGRESS NOTES
"   SUBJECTIVE:   CC: Aracelis Merritt is an 53 year old woman who presents for preventive health visit.     Healthy Habits:    Do you get at least three servings of calcium containing foods daily (dairy, green leafy vegetables, etc.)? No Dairy, Gets 3 servings of fruit and vegetables, also taking supplements.    Amount of exercise or daily activities, outside of work: None, as of recent. Typically twice weekly exercise though.    Problems taking medications regularly No    Medication side effects: No    Have you had an eye exam in the past two years? No     Do you see a dentist twice per year? Yes     Do you have sleep apnea, excessive snoring or daytime drowsiness? No    Still working at [employer] and that is good  Discussed bloating last year and says \"I know I was dx with IBS in the past\" but still having daily bloating symptom that is bothersome to her  Did just finish The Whole 30 diet which was good but still has bloating daily especially after a salad  Bowels are \"so sporadic\"; can have diarrhea some but has at least some BM amount daily so never feels constipated  Takes Metamucil daily and plenty of water  No artificial sugar in diet  Doesn't wake up bloated but as soon as has coffee then bloating starts  No BM overnight ever; does feel some better after BM but has some lingering pressure  Going through perimenopausal changes pretty well without significant sweats/flashes  Weight loss d/t Whole 30 Diet but otherwise weight stable  Negative TTG last year   No family h/o bowel problems  Wt Readings from Last 4 Encounters:   11/21/18 130 lb (59 kg)   12/15/17 140 lb (63.5 kg)   09/20/16 139 lb (63 kg)   03/08/16 140 lb 3.2 oz (63.6 kg)   Family h/o high cholesterol so she is hoping her cholesterol is better with Whole 30 Diet  No period in 2 years    Today's PHQ-2 Score:   PHQ-2 ( 1999 Pfizer) 11/21/2018 2/18/2016   Q1: Little interest or pleasure in doing things 0 0   Q2: Feeling down, depressed or hopeless " "0 0   PHQ-2 Score 0 0       Abuse: Current or Past(Physical, Sexual or Emotional)- NO  Do you feel safe in your environment - YES    Social History   Substance Use Topics     Smoking status: Former Smoker     Quit date: 8/20/2013     Smokeless tobacco: Never Used     Alcohol use 3.6 oz/week     6 Standard drinks or equivalent per week      Comment: 1 drink per week      If you drink alcohol do you typically have >3 drinks per day or >7 drinks per week? No                     Reviewed orders with patient.  Reviewed health maintenance and updated orders accordingly - Yes  Patient over age 50, mutual decision to screen reflected in health maintenance.  Pertinent mammograms are reviewed under the imaging tab.  History of abnormal Pap smear: NO - age 30-65 PAP every 5 years with negative HPV co-testing recommended  PAP / HPV Latest Ref Rng & Units 8/20/2015   PAP - NIL   HPV 16 DNA NEG Negative   HPV 18 DNA NEG Negative   OTHER HR HPV NEG Negative     ROS:  Comprehensive ROS negative unless as stated above in HPI.     OBJECTIVE:   /83 (BP Location: Right arm, Patient Position: Sitting, Cuff Size: Adult Regular)  Pulse 63  Temp 98.4  F (36.9  C) (Oral)  Ht 5' 3\" (1.6 m)  Wt 130 lb (59 kg)  LMP 08/06/2015  SpO2 96%  Breastfeeding? No  BMI 23.03 kg/m2  EXAM:  GENERAL APPEARANCE: healthy, alert and no distress, bright and cheerful  EYES: Eyes grossly normal to inspection, PERRL and conjunctivae and sclerae normal  HENT: ear canals and TM's normal, nose and mouth without ulcers or lesions, oropharynx clear and oral mucous membranes moist  NECK: no adenopathy, no asymmetry, masses, or scars and thyroid normal to palpation  RESP: lungs clear to auscultation - no rales, rhonchi or wheezes  BREAST: normal without masses, tenderness or nipple discharge and no palpable axillary masses or adenopathy  CV: regular rate and rhythm, normal S1 S2, no S3 or S4, no murmur, click or rub, no peripheral edema and no carotid " bruits  ABDOMEN: soft, nontender, no hepatosplenomegaly, no masses and bowel sounds normal, no guarding/rebound   (female): normal female external genitalia, normal urethral meatus, vaginal mucosal atrophy noted, normal cervix, adnexae, and uterus without masses or abnormal discharge  MS: no musculoskeletal defects are noted and gait is age appropriate without ataxia  SKIN: no suspicious lesions or rashes but does have fair skin  NEURO: Normal strength and tone, mentation intact and speech normal  PSYCH: mentation appears normal and affect normal/bright      ASSESSMENT/PLAN:   1. Encounter for preventative adult health care exam with abnormal findings  Up to date on preventative health care    2. Essential hypertension, benign  At goal  - losartan (COZAAR) 50 MG tablet; Take 1 tablet (50 mg) by mouth daily  Dispense: 90 tablet; Refill: 3  - CBC with platelets  - Comprehensive metabolic panel    3. Mixed hyperlipidemia  Will recheck labs; just finished Whole 30 Diet  Family h/o HTN and HLP  - Lipid panel reflex to direct LDL Fasting    4. Anxiety  Stable on chronic Celexa  - citalopram (CELEXA) 20 MG tablet; Take 1 tablet (20 mg) by mouth daily  Dispense: 90 tablet; Refill: 3    5. Abdominal bloating  Long h/o IBS per report and is up to date on colonoscopy; exam unremarkable today  Weight down a bit but she attributes that to just the past month on The Whole 30 Diet  Extensive labs done last year  Hopefully is r/t IBS and can then have her see GI but in the meantime, first, will investigate with imaging b/c her symptoms have been consistent and persistent over the past year  She'd prefer to go to [facility]  - CT Abdomen Pelvis w Contrast; Future    6. Screening for cervical cancer  - Pap imaged thin layer screen with HPV - recommended age 30 - 65 years (select HPV order below)  - HPV High Risk Types DNA Cervical    COUNSELING:   Reviewed preventive health counseling, as reflected in patient instructions      "  Regular exercise       Healthy diet/nutrition    BP Readings from Last 1 Encounters:   11/21/18 124/83     Estimated body mass index is 23.03 kg/(m^2) as calculated from the following:    Height as of this encounter: 5' 3\" (1.6 m).    Weight as of this encounter: 130 lb (59 kg).     reports that she quit smoking about 5 years ago. She has never used smokeless tobacco.      Patient Instructions   Labs today  Future CT scan and possible referral to GI physician  Future mammogram - [facility]: [phone] in [address] downstairs    MDM: Additional time outside of preventative health care spent discussing her on-going bloating and work-up/plan.    Sana Cottrell, DO  [facility]  "

## 2018-11-21 NOTE — PATIENT INSTRUCTIONS
Labs today  Future CT scan and possible referral to GI physician  Future mammogram - [facility]: [phone] in [address] downstairs    Preventive Health Recommendations  Female Ages 50 - 64    Yearly exam: See your health care provider every year in order to  o Review health changes.   o Discuss preventive care.    o Review your medicines if your doctor has prescribed any.      Get a Pap test every three years (unless you have an abnormal result and your provider advises testing more often).    If you get Pap tests with HPV test, you only need to test every 5 years, unless you have an abnormal result.     You do not need a Pap test if your uterus was removed (hysterectomy) and you have not had cancer.    You should be tested each year for STDs (sexually transmitted diseases) if you're at risk.     Have a mammogram every 1 to 2 years.    Have a colonoscopy at age 50, or have a yearly FIT test (stool test). These exams screen for colon cancer.      Have a cholesterol test every 5 years, or more often if advised.    Have a diabetes test (fasting glucose) every three years. If you are at risk for diabetes, you should have this test more often.     If you are at risk for osteoporosis (brittle bone disease), think about having a bone density scan (DEXA).    Shots: Get a flu shot each year. Get a tetanus shot every 10 years.    Nutrition:     Eat at least 5 servings of fruits and vegetables each day.    Eat whole-grain bread, whole-wheat pasta and brown rice instead of white grains and rice.    Get adequate Calcium and Vitamin D.     Lifestyle    Exercise at least 150 minutes a week (30 minutes a day, 5 days a week). This will help you control your weight and prevent disease.    Limit alcohol to one drink per day.    No smoking.     Wear sunscreen to prevent skin cancer.     See your dentist every six months for an exam and cleaning.    See your eye doctor every 1 to 2 years.

## 2018-11-22 PROBLEM — R14.0 ABDOMINAL BLOATING: Status: ACTIVE | Noted: 2018-11-22

## 2018-11-23 ENCOUNTER — MYC MEDICAL ADVICE (OUTPATIENT)
Dept: FAMILY MEDICINE | Facility: CLINIC | Age: 53
End: 2018-11-23

## 2018-11-23 ENCOUNTER — TELEPHONE (OUTPATIENT)
Dept: FAMILY MEDICINE | Facility: CLINIC | Age: 53
End: 2018-11-23

## 2018-11-23 DIAGNOSIS — R14.0 ABDOMINAL BLOATING: Primary | ICD-10-CM

## 2018-11-23 NOTE — TELEPHONE ENCOUNTER
Pt requested we ask again.  She is very worried about the radiation level with CT. Discussed and advised that level is safe, but pt still requesting to do U/S fist. States will feel much more comfortable with this, and understands she will have to pay for both.

## 2018-11-23 NOTE — TELEPHONE ENCOUNTER
Spoke to pt, who is concerned about insurance question now. Advised to call insurance and tell her test and ask what is covered. Sent [employer] with this information.  Aurora Moore RN

## 2018-11-23 NOTE — TELEPHONE ENCOUNTER
Reason for Call:  Other call back    Detailed comments: patient saw Dr. Cottrell last week, and they discussed her having an ultrasound and a CT scan done.      Patient would like to know if it would be OK to have the ultrasound done first, then if needed she would have the CT scan done?  She is a bit nervous about having the CT scan done.    Please call to advise     Phone Number Patient can be reached at: Home number on file [phone] (home)    Best Time: anytime    Can we leave a detailed message on this number? YES    Call taken on 11/23/2018 at 11:10 AM by Marian Harrington  .

## 2018-11-23 NOTE — TELEPHONE ENCOUNTER
Ok, will do. Pelvic US ordered for [facility] (not sure if they do at [facility] or not where she was considering going)

## 2018-11-23 NOTE — TELEPHONE ENCOUNTER
CT will give more detail than US so she doesn't need both; I'd recommend as we discussed at visit that CT gives more information. If she has specific questions though that still remain please let me know. Thanks!

## 2018-11-26 PROBLEM — D69.6 THROMBOCYTOPENIA (H): Status: ACTIVE | Noted: 2018-11-26

## 2018-11-26 LAB
COPATH REPORT: NORMAL
PAP: NORMAL

## 2018-11-27 LAB
FINAL DIAGNOSIS: NORMAL
HPV HR 12 DNA CVX QL NAA+PROBE: NEGATIVE
HPV16 DNA SPEC QL NAA+PROBE: NEGATIVE
HPV18 DNA SPEC QL NAA+PROBE: NEGATIVE
SPECIMEN DESCRIPTION: NORMAL
SPECIMEN SOURCE CVX/VAG CYTO: NORMAL

## 2018-12-05 NOTE — TELEPHONE ENCOUNTER
Order for ultrasound faxed over to [facility] and placed in accordion folder on Alpa pod.    Chi Salazar CMA on 12/5/2018 at 9:30 AM

## 2018-12-10 ENCOUNTER — TRANSFERRED RECORDS (OUTPATIENT)
Dept: HEALTH INFORMATION MANAGEMENT | Facility: CLINIC | Age: 53
End: 2018-12-10

## 2019-06-19 ENCOUNTER — OFFICE VISIT (OUTPATIENT)
Dept: FAMILY MEDICINE | Facility: CLINIC | Age: 54
End: 2019-06-19
Payer: COMMERCIAL

## 2019-06-19 VITALS
WEIGHT: 135.4 LBS | HEIGHT: 63 IN | TEMPERATURE: 97.2 F | DIASTOLIC BLOOD PRESSURE: 95 MMHG | BODY MASS INDEX: 23.99 KG/M2 | HEART RATE: 73 BPM | SYSTOLIC BLOOD PRESSURE: 148 MMHG | OXYGEN SATURATION: 97 %

## 2019-06-19 DIAGNOSIS — E78.2 MIXED HYPERLIPIDEMIA: Chronic | ICD-10-CM

## 2019-06-19 DIAGNOSIS — I10 BENIGN ESSENTIAL HYPERTENSION: ICD-10-CM

## 2019-06-19 DIAGNOSIS — F41.9 ANXIETY: Primary | Chronic | ICD-10-CM

## 2019-06-19 PROCEDURE — 99214 OFFICE O/P EST MOD 30 MIN: CPT | Performed by: INTERNAL MEDICINE

## 2019-06-19 RX ORDER — ESCITALOPRAM OXALATE 20 MG/1
20 TABLET ORAL DAILY
Qty: 90 TABLET | Refills: 3 | Status: SHIPPED | OUTPATIENT
Start: 2019-06-19 | End: 2019-11-27 | Stop reason: ALTCHOICE

## 2019-06-19 ASSESSMENT — MIFFLIN-ST. JEOR: SCORE: 1183.3
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
## 2019-08-09 DIAGNOSIS — F41.9 ANXIETY: ICD-10-CM

## 2019-08-09 RX ORDER — CITALOPRAM HYDROBROMIDE 20 MG/1
20 TABLET ORAL DAILY
Start: 2019-08-09

## 2019-08-09 NOTE — TELEPHONE ENCOUNTER
Discontinued.    Note from 6/19/19 OV:  F41.9) Anxiety  Comment: symptoms not well controled on Celexa. Patient is interested in a trial of Lexapro instead.  Plan: escitalopram (LEXAPRO) 20 MG tablet     Lindy Hunter RN

## 2019-08-09 NOTE — TELEPHONE ENCOUNTER
"Last Written Prescription Date:  11/21/18  Last Fill Quantity: 90 tablet,  # refills: 3   Last office visit: 6/19/2019 with prescribing provider:  Michelle   Future Office Visit:      Routing refill request to provider for review/approval because:  Drug not active on patient's medication list    Discontinued 6/19/2019, Reason for Discontinue: Alternate therapy. Patient taking Escitalopram, given #90 R-3 on 6/19/2019.    [facility] Follow-up to PHQ 8/20/2015 1/21/2016 9/20/2016   PHQ-9 9. Suicide Ideation past 2 weeks Not at all Not at all Not at all     No flowsheet data found.      Requested Prescriptions   Pending Prescriptions Disp Refills     citalopram (CELEXA) 20 MG tablet 90 tablet 3     Sig: Take 1 tablet (20 mg) by mouth daily       SSRIs Protocol Failed - 8/9/2019  9:52 AM        Failed - Medication is active on med list        Passed - Recent (12 mo) or future (30 days) visit within the authorizing provider's specialty     Patient had office visit in the last 12 months or has a visit in the next 30 days with authorizing provider or within the authorizing provider's specialty.  See \"Patient Info\" tab in inbasket, or \"Choose Columns\" in Meds & Orders section of the refill encounter.              Passed - Patient is age 18 or older        Passed - No active pregnancy on record        Passed - No positive pregnancy test in last 12 months          "

## 2019-08-14 NOTE — TELEPHONE ENCOUNTER
Pt called to follow up/ she want to switch from Lexapro to Celexa 10 MG/  As prev discussed with Dr Martinez  Pt is running out of script please fill [id number] - [place], [place] - [address]

## 2019-08-23 NOTE — TELEPHONE ENCOUNTER
Dr. Martinez-     This message did not get to you last week--    Pt has stopped Lexapro. She is interested in switching back to Citalopram 10mg, she has filled her citalopram 20mg in the meantime.     Please see refill request for citalopram 10mg-pended.     Thank you,   Ela POLLARD RN

## 2019-08-26 RX ORDER — CITALOPRAM HYDROBROMIDE 10 MG/1
10 TABLET ORAL DAILY
Qty: 90 TABLET | Refills: 1 | Status: SHIPPED | OUTPATIENT
Start: 2019-08-26 | End: 2020-07-06 | Stop reason: ALTCHOICE

## 2019-10-02 ENCOUNTER — HEALTH MAINTENANCE LETTER (OUTPATIENT)
Age: 54
End: 2019-10-02

## 2019-11-26 NOTE — PROGRESS NOTES
Aracelis is a 54 year old  female who presents for annual exam.     Besides routine health maintenance, she has no other health concerns today .    HPI: here for annual exam.  Is in menopause, but minimal symptoms, declined HRT.  She was switched to lexapro 20 mg last summer and felt weird on it.  So was put back on celexa but only the 10 mg and does not feel like enough.  In the past she was on 20 mg, so would like to go up to 20mg. Also wants to see a therapist for awhile.  She is a .  Blood work last year was normal.    The patient's PCP is  [facility].        GYNECOLOGIC HISTORY:    Patient's last menstrual period was 2015. Post menopausal        Her current contraception method is: menopause.  She  reports that she quit smoking about 6 years ago. She has never used smokeless tobacco.    Patient is sexually active.  STD testing offered?  Declined  Last PHQ-9 score on record =   PHQ-9 SCORE 2016   PHQ-9 Total Score -   PHQ-9 Total Score 1     Last GAD7 score on record = No flowsheet data found.  Alcohol Score =     HEALTH MAINTENANCE:  Cholesterol:  Cholesterol   Date Value Ref Range Status   2018 204 (H) <200 mg/dL Final     Comment:     Desirable:       <200 mg/dl   2017 265 (H) <200 mg/dL Final     Comment:     Desirable:       <200 mg/dl      Last Mammo: 12/10/2018, Result: Normal, Next Mammo: 2019   Pap:  Lab Results   Component Value Date    PAP NIL. HPV - 2018    PAP NIL 2015     Colonoscopy:  3/27/2017 [facility], Result: Polyp and Hemorrhoid noted otherwise normal, Next Colonoscopy  , Polyp observation  Dexa:  NA    Health maintenance updated:  yes    HISTORY:  OB History    Para Term  AB Living   0 0 0 0 0 0   SAB TAB Ectopic Multiple Live Births   0 0 0 0 0       Patient Active Problem List   Diagnosis     Benign essential hypertension; goal < 140/90     Anxiety     Mixed hyperlipidemia      Abdominal bloating     Thrombocytopenia (H)     Past Surgical History:   Procedure Laterality Date     NO HISTORY OF SURGERY        Social History     Tobacco Use     Smoking status: Former Smoker     Last attempt to quit: 2013     Years since quittin.2     Smokeless tobacco: Never Used   Substance Use Topics     Alcohol use: Yes     Alcohol/week: 6.0 standard drinks     Types: 6 Standard drinks or equivalent per week     Comment: 1 drink per week       Problem (# of Occurrences) Relation (Name,Age of Onset)    Cerebrovascular Disease (1) Mother    Chemical Addiction (1) Father    Coronary Artery Disease (1) Father    Depression/Anxiety (2) Father, Brother: all four brothers    Hypertension (1) Mother    No Known Problems (4) Sister, Maternal Grandfather, Paternal Grandmother, Other    Osteoporosis (1) Maternal Grandmother            Current Outpatient Medications   Medication Sig     citalopram (CELEXA) 10 MG tablet Take 1 tablet (10 mg) by mouth daily     Cyanocobalamin (VITAMIN B 12 PO) Take 1,000 mcg by mouth     escitalopram (LEXAPRO) 20 MG tablet Take 1 tablet (20 mg) by mouth daily     losartan (COZAAR) 50 MG tablet Take 1 tablet (50 mg) by mouth daily     multivitamin w/minerals (MULTI-VITAMIN) tablet Take 1 tablet by mouth daily     psyllium (METAMUCIL) 28.3 % POWD Take 1 teaspoonful by mouth 2 times daily     Saccharomyces boulardii (PROBIOTIC) 250 MG CAPS Take by mouth daily as needed (30 Billion CFU's)     VITAMIN D, CHOLECALCIFEROL, PO Take 1,000 Units by mouth daily     No current facility-administered medications for this visit.      Allergies   Allergen Reactions     Codeine Anxiety       Past medical, surgical, social and family histories were reviewed and updated in EPIC.    ROS:   12 point review of systems negative other than symptoms noted below or in the HPI.  Musculoskeletal: Joint Pain and right knee aching  and tightness  No urinary frequency or dysuria, bladder or kidney  "problems    EXAM:  /80   Pulse 80   Ht 1.6 m (5' 3\")   Wt 61.1 kg (134 lb 12.8 oz)   LMP 08/06/2015   BMI 23.88 kg/m     BMI: Body mass index is 23.88 kg/m .    PHYSICAL EXAM:  Constitutional:   Appearance: Well nourished, well developed, alert, in no acute distress  Neck:  Lymph Nodes:  No lymphadenopathy present    Thyroid:  Gland size normal, nontender, no nodules or masses present  on palpation  Chest:  Respiratory Effort:  Breathing unlabored  Cardiovascular:    Heart: Auscultation:  Regular rate, normal rhythm, no murmurs present  Breasts: Inspection of Breasts:  No lymphadenopathy present., Palpation of Breasts and Axillae:  No masses present on palpation, no breast tenderness., Axillary Lymph Nodes:  No lymphadenopathy present. and No nodularity, asymmetry or nipple discharge bilaterally.  Gastrointestinal:   Abdominal Examination:  Abdomen nontender to palpation, tone normal without rigidity or guarding, no masses present, umbilicus without lesions   Liver and Spleen:  No hepatomegaly present, liver nontender to palpation    Hernias:  No hernias present  Lymphatic: Lymph Nodes:  No other lymphadenopathy present  Skin:  General Inspection:  No rashes present, no lesions present, no areas of  discoloration  Neurologic:    Mental Status:  Oriented X3.  Normal strength and tone, sensory exam                grossly normal, mentation intact and speech normal.    Psychiatric:   Mentation appears normal and affect normal/bright.         Pelvic Exam:  External Genitalia:     Normal appearance for age, no discharge present, no tenderness present, no inflammatory lesions present, color normal  Vagina:     Normal vaginal vault without central or paravaginal defects, no discharge present, no inflammatory lesions present, no masses present  Bladder:     Nontender to palpation  Urethra:   Urethral Body:  Urethra palpation normal, urethra structural support normal   Urethral Meatus:  No erythema or lesions " present  Cervix:     Appearance healthy, no lesions present, nontender to palpation, no bleeding present  Uterus:     Uterus: firm, normal sized and nontender, anteverted in position.   Adnexa:     No adnexal tenderness present, no adnexal masses present  Perineum:     Perineum within normal limits, no evidence of trauma, no rashes or skin lesions present  Anus:     Anus within normal limits, no hemorrhoids present  Inguinal Lymph Nodes:     No lymphadenopathy present  Pubic Hair:     Normal pubic hair distribution for age  Genitalia and Groin:     No rashes present, no lesions present, no areas of discoloration, no masses present      COUNSELING:   Reviewed preventive health counseling, as reflected in patient instructions       Regular exercise       Healthy diet/nutrition       Osteoporosis Prevention/Bone Health       Colon cancer screening       (Neelam)menopause management    BMI: Body mass index is 23.88 kg/m .      ASSESSMENT:  54 year old female with satisfactory annual exam.    ICD-10-CM    1. Screening for cervical cancer Z12.4 Pap imaged thin layer screen with HPV - recommended age 30 - 65     HPV High Risk Types DNA Cervical       PLAN:  Normal Gyn exam.  Will increase celexa to 20mg daily and see how goes.  She has a full prescription right now, so none needed.    Referral to a therapist.  Return prn or 1 year for annual.    BELLA Barney CNP

## 2019-11-27 ENCOUNTER — OFFICE VISIT (OUTPATIENT)
Dept: OBGYN | Facility: CLINIC | Age: 54
End: 2019-11-27
Attending: NURSE PRACTITIONER
Payer: COMMERCIAL

## 2019-11-27 ENCOUNTER — ANCILLARY PROCEDURE (OUTPATIENT)
Dept: MAMMOGRAPHY | Facility: CLINIC | Age: 54
End: 2019-11-27
Attending: NURSE PRACTITIONER
Payer: COMMERCIAL

## 2019-11-27 VITALS
WEIGHT: 134.8 LBS | HEIGHT: 63 IN | SYSTOLIC BLOOD PRESSURE: 120 MMHG | DIASTOLIC BLOOD PRESSURE: 80 MMHG | BODY MASS INDEX: 23.88 KG/M2 | HEART RATE: 80 BPM

## 2019-11-27 DIAGNOSIS — Z12.31 VISIT FOR SCREENING MAMMOGRAM: ICD-10-CM

## 2019-11-27 DIAGNOSIS — Z12.4 SCREENING FOR CERVICAL CANCER: Primary | ICD-10-CM

## 2019-11-27 PROCEDURE — G0145 SCR C/V CYTO,THINLAYER,RESCR: HCPCS | Performed by: NURSE PRACTITIONER

## 2019-11-27 PROCEDURE — 87624 HPV HI-RISK TYP POOLED RSLT: CPT | Performed by: NURSE PRACTITIONER

## 2019-11-27 PROCEDURE — 77067 SCR MAMMO BI INCL CAD: CPT | Mod: TC

## 2019-11-27 PROCEDURE — 99386 PREV VISIT NEW AGE 40-64: CPT | Performed by: NURSE PRACTITIONER

## 2019-11-27 RX ORDER — MULTIPLE VITAMINS W/ MINERALS TAB 9MG-400MCG
1 TAB ORAL DAILY
COMMUNITY

## 2019-11-27 ASSESSMENT — PATIENT HEALTH QUESTIONNAIRE - PHQ9
SUM OF ALL RESPONSES TO PHQ QUESTIONS 1-9: 1
5. POOR APPETITE OR OVEREATING: NOT AT ALL

## 2019-11-27 ASSESSMENT — MIFFLIN-ST. JEOR: SCORE: 1180.58

## 2019-11-27 ASSESSMENT — ANXIETY QUESTIONNAIRES
2. NOT BEING ABLE TO STOP OR CONTROL WORRYING: SEVERAL DAYS
6. BECOMING EASILY ANNOYED OR IRRITABLE: SEVERAL DAYS
IF YOU CHECKED OFF ANY PROBLEMS ON THIS QUESTIONNAIRE, HOW DIFFICULT HAVE THESE PROBLEMS MADE IT FOR YOU TO DO YOUR WORK, TAKE CARE OF THINGS AT HOME, OR GET ALONG WITH OTHER PEOPLE: NOT DIFFICULT AT ALL
7. FEELING AFRAID AS IF SOMETHING AWFUL MIGHT HAPPEN: NOT AT ALL
5. BEING SO RESTLESS THAT IT IS HARD TO SIT STILL: NOT AT ALL
3. WORRYING TOO MUCH ABOUT DIFFERENT THINGS: SEVERAL DAYS
1. FEELING NERVOUS, ANXIOUS, OR ON EDGE: SEVERAL DAYS
GAD7 TOTAL SCORE: 4

## 2019-11-28 ASSESSMENT — ANXIETY QUESTIONNAIRES: GAD7 TOTAL SCORE: 4

## 2019-12-02 ENCOUNTER — TRANSFERRED RECORDS (OUTPATIENT)
Dept: HEALTH INFORMATION MANAGEMENT | Facility: CLINIC | Age: 54
End: 2019-12-02

## 2019-12-03 LAB
COPATH REPORT: NORMAL
PAP: NORMAL

## 2019-12-21 DIAGNOSIS — I10 ESSENTIAL HYPERTENSION, BENIGN: Chronic | ICD-10-CM

## 2019-12-23 RX ORDER — LOSARTAN POTASSIUM 50 MG/1
TABLET ORAL
Qty: 90 TABLET | Refills: 0 | Status: SHIPPED | OUTPATIENT
Start: 2019-12-23 | End: 2020-03-17

## 2019-12-23 NOTE — TELEPHONE ENCOUNTER
Had OV with ; note to patient and pharmacy to schedule OV and labs prior to next refill.  Needs new PCP in 's absence.    Pauline Alaniz RN on 12/23/2019 at 2:51 PM

## 2019-12-23 NOTE — TELEPHONE ENCOUNTER
"losartan (COZAAR) 50 MG tablet    Last Written Prescription Date:  11/21/2018  Last Fill Quantity: 90,  # refills: 3   Last office visit: 6/19/2019 with prescribing provider:  Michelle   Future Office Visit:  Unknown     Requested Prescriptions   Pending Prescriptions Disp Refills     losartan (COZAAR) 50 MG tablet [Pharmacy Med Name: Losartan Potassium Oral Tablet 50 MG] 90 tablet 2     Sig: TAKE ONE TABLET BY MOUTH ONE TIME DAILY       Angiotensin-II Receptors Failed - 12/21/2019  2:34 PM        Failed - Normal serum creatinine on file in past 12 months     Recent Labs   Lab Test 11/21/18  1332   CR 0.63             Failed - Normal serum potassium on file in past 12 months     Recent Labs   Lab Test 11/21/18  1332   POTASSIUM 4.1                    Passed - Last blood pressure under 140/90 in past 12 months     BP Readings from Last 3 Encounters:   11/27/19 120/80   06/19/19 (!) 148/95   11/21/18 124/83                 Passed - Recent (12 mo) or future (30 days) visit within the authorizing provider's specialty     Patient has had an office visit with the authorizing provider or a provider within the authorizing providers department within the previous 12 mos or has a future within next 30 days. See \"Patient Info\" tab in inbasket, or \"Choose Columns\" in Meds & Orders section of the refill encounter.              Passed - Medication is active on med list        Passed - Patient is age 18 or older        Passed - No active pregnancy on record        Passed - No positive pregnancy test in past 12 months          "

## 2020-02-09 DIAGNOSIS — F41.9 ANXIETY: ICD-10-CM

## 2020-02-10 RX ORDER — CITALOPRAM HYDROBROMIDE 20 MG/1
TABLET ORAL
Qty: 30 TABLET | Refills: 0 | Status: SHIPPED | OUTPATIENT
Start: 2020-02-10 | End: 2020-03-17

## 2020-02-10 NOTE — TELEPHONE ENCOUNTER
A 30 day supply is given, patient is due for an office visit.  Please call to  assist the patient in scheduling an appointment.  Per LOV note from 6/19/2019: Return in about 1 month (around 7/19/2019) for depression, anxiety.     JANES GuerreroN, RN  [facility] [facility]

## 2020-02-10 NOTE — TELEPHONE ENCOUNTER
"citalopram (CELEXA) 10 MG tablet 90 tablet 1 8/26/2019     Last Written Prescription Date:  8/26/2019  Last Fill Quantity: 90,  # refills: 1   Last office visit: 6/19/2019 with prescribing provider: KEREN Martinez    Future Office Visit: Unknown   Requested Prescriptions   Pending Prescriptions Disp Refills     citalopram (CELEXA) 20 MG tablet [Pharmacy Med Name: Citalopram Hydrobromide Oral Tablet 20 MG] 90 tablet 2     Sig: TAKE ONE TABLET BY MOUTH ONE TIME DAILY       SSRIs Protocol Passed - 2/9/2020  9:45 AM        Passed - Recent (12 mo) or future (30 days) visit within the authorizing provider's specialty     Patient has had an office visit with the authorizing provider or a provider within the authorizing providers department within the previous 12 mos or has a future within next 30 days. See \"Patient Info\" tab in inbasket, or \"Choose Columns\" in Meds & Orders section of the refill encounter.              Passed - Medication is active on med list        Passed - Patient is age 18 or older        Passed - No active pregnancy on record        Passed - No positive pregnancy test in last 12 months          "

## 2020-03-15 DIAGNOSIS — I10 ESSENTIAL HYPERTENSION, BENIGN: Chronic | ICD-10-CM

## 2020-03-15 DIAGNOSIS — F41.9 ANXIETY: ICD-10-CM

## 2020-03-16 NOTE — TELEPHONE ENCOUNTER
"Last Written Prescription Date:  2/10/20  Last Fill Quantity: 30,  # refills: 0   Last office visit: 6/19/2019 with prescribing provider:     Future Office Visit:    Requested Prescriptions   Pending Prescriptions Disp Refills     citalopram (CELEXA) 20 MG tablet [Pharmacy Med Name: Citalopram Hydrobromide Oral Tablet 20 MG] 30 tablet 0     Sig: TAKE ONE TABLET BY MOUTH ONCE DAILY       SSRIs Protocol Passed - 3/15/2020  3:24 PM        Passed - Recent (12 mo) or future (30 days) visit within the authorizing provider's specialty     Patient has had an office visit with the authorizing provider or a provider within the authorizing providers department within the previous 12 mos or has a future within next 30 days. See \"Patient Info\" tab in inbasket, or \"Choose Columns\" in Meds & Orders section of the refill encounter.              Passed - Medication is active on med list        Passed - Patient is age 18 or older        Passed - No active pregnancy on record        Passed - No positive pregnancy test in last 12 months             "

## 2020-03-16 NOTE — TELEPHONE ENCOUNTER
"losartan (COZAAR) 50 MG tablet  90 tablet  0  12/23/2019          Last Written Prescription Date:  12/23/2019  Last Fill Quantity: 90,  # refills: 0   Last office visit: 6/19/2019 with prescribing provider:     Future Office Visit:  Unknown    Requested Prescriptions   Pending Prescriptions Disp Refills     losartan (COZAAR) 50 MG tablet [Pharmacy Med Name: Losartan Potassium Oral Tablet 50 MG] 90 tablet 0     Sig: TAKE ONE TABLET BY MOUTH ONCE DAILY       Angiotensin-II Receptors Failed - 3/15/2020  3:24 PM        Failed - Normal serum creatinine on file in past 12 months     Recent Labs   Lab Test 11/21/18  1332   CR 0.63       Ok to refill medication if creatinine is low          Failed - Normal serum potassium on file in past 12 months     Recent Labs   Lab Test 11/21/18  1332   POTASSIUM 4.1                    Passed - Last blood pressure under 140/90 in past 12 months     BP Readings from Last 3 Encounters:   11/27/19 120/80   06/19/19 (!) 148/95   11/21/18 124/83                 Passed - Recent (12 mo) or future (30 days) visit within the authorizing provider's specialty     Patient has had an office visit with the authorizing provider or a provider within the authorizing providers department within the previous 12 mos or has a future within next 30 days. See \"Patient Info\" tab in inbasket, or \"Choose Columns\" in Meds & Orders section of the refill encounter.              Passed - Medication is active on med list        Passed - Patient is age 18 or older        Passed - No active pregnancy on record        Passed - No positive pregnancy test in past 12 months             "

## 2020-03-17 RX ORDER — LOSARTAN POTASSIUM 50 MG/1
TABLET ORAL
Qty: 90 TABLET | Refills: 0 | Status: SHIPPED | OUTPATIENT
Start: 2020-03-17 | End: 2020-07-06

## 2020-03-18 RX ORDER — CITALOPRAM HYDROBROMIDE 20 MG/1
TABLET ORAL
Qty: 90 TABLET | Refills: 0 | Status: SHIPPED | OUTPATIENT
Start: 2020-03-18 | End: 2020-07-06

## 2020-07-01 ENCOUNTER — TELEPHONE (OUTPATIENT)
Dept: OBGYN | Facility: CLINIC | Age: 55
End: 2020-07-01

## 2020-07-01 DIAGNOSIS — I10 ESSENTIAL HYPERTENSION, BENIGN: Chronic | ICD-10-CM

## 2020-07-01 DIAGNOSIS — F41.9 ANXIETY: ICD-10-CM

## 2020-07-01 NOTE — TELEPHONE ENCOUNTER
Patient called pharmacy and they were unable to refill her medications:  losartan (COZAAR) 50 MG tablet  citalopram (CELEXA) 20 MG tablet  Asked if a nurse could look into this?

## 2020-07-01 NOTE — TELEPHONE ENCOUNTER
These medication refills are prescribed by another physician, both requests have already been routed to the appropriate provider.   Adali Milian RN on 7/1/2020 at 3:00 PM

## 2020-07-06 RX ORDER — CITALOPRAM HYDROBROMIDE 20 MG/1
20 TABLET ORAL DAILY
Qty: 90 TABLET | Refills: 1 | Status: SHIPPED | OUTPATIENT
Start: 2020-07-06 | End: 2020-11-30

## 2020-07-06 NOTE — TELEPHONE ENCOUNTER
Patient calling stating she discussed medication with Valeria last year - see note below from visit. Dr Martinez is refusing to fill since has not seen him.     PLAN:  Normal Gyn exam.  Will increase celexa to 20mg daily and see how goes.  She has a full prescription right now, so none needed.    Referral to a therapist.  Return prn or 1 year for annual.     BELLA Barney CNP

## 2020-07-06 NOTE — TELEPHONE ENCOUNTER
Annual with ELENA Gamble NP 11/27/19 and medications were reviewed. Clearly documented discussion on the Celexa 20mg daily - refill approved until annual 11/2020    Losartan 50 mg was originally ordered by her PCP  Dr GILBERTO Cottrell who has not left that clinic and Dr Martinez approved refills. Now needing a refill on Losartan and Dr Martinez's group will not approve as she would be due for annual at that group.  Pt insists her most recent annual with ELENA Gamble NP it was discussed to continue Losartan at same dose, but no refill was sent. She is planning to continue her annuals with Valeria, next due 11/2020. Down to 1 pill of Losartan and needs refill.    Routing pt request to refill Losartan to ELENA Gamble NP - ok to refill Losartan? Pended Rx.    Call pt with response tomorrow when Valeria in office.    Tabitha Cordon RN on 7/6/2020 at 1:29 PM

## 2020-07-07 RX ORDER — LOSARTAN POTASSIUM 50 MG/1
50 TABLET ORAL DAILY
Qty: 90 TABLET | Refills: 1 | Status: SHIPPED | OUTPATIENT
Start: 2020-07-07 | End: 2020-11-30

## 2020-09-10 ENCOUNTER — TELEPHONE (OUTPATIENT)
Dept: OBGYN | Facility: CLINIC | Age: 55
End: 2020-09-10

## 2020-09-10 DIAGNOSIS — J01.10 ACUTE FRONTAL SINUSITIS, RECURRENCE NOT SPECIFIED: Primary | ICD-10-CM

## 2020-09-10 RX ORDER — AZITHROMYCIN 250 MG/1
TABLET, FILM COATED ORAL
Qty: 6 TABLET | Refills: 0 | Status: SHIPPED | OUTPATIENT
Start: 2020-09-10 | End: 2020-09-22

## 2020-09-10 NOTE — TELEPHONE ENCOUNTER
Patient is calling back again hoping to know if she can get something for her ear pain.    Thank you

## 2020-09-10 NOTE — TELEPHONE ENCOUNTER
Went on Motorcycle ride - didn't wear earbuds - now having sinus/ear symptoms going into her throat - asking if she can get something - please call her to discuss  She knows it sounds like covid, but knows it isn't that       Pharmacy - [facility]

## 2020-09-14 ENCOUNTER — TELEPHONE (OUTPATIENT)
Dept: OBGYN | Facility: CLINIC | Age: 55
End: 2020-09-14

## 2020-09-14 ENCOUNTER — HOSPITAL ENCOUNTER (EMERGENCY)
Facility: CLINIC | Age: 55
Discharge: HOME | End: 2020-09-14
Attending: EMERGENCY MEDICINE | Admitting: EMERGENCY MEDICINE
Payer: COMMERCIAL

## 2020-09-14 VITALS
TEMPERATURE: 97.5 F | HEIGHT: 63 IN | HEART RATE: 74 BPM | OXYGEN SATURATION: 95 % | RESPIRATION RATE: 18 BRPM | WEIGHT: 130 LBS | SYSTOLIC BLOOD PRESSURE: 162 MMHG | DIASTOLIC BLOOD PRESSURE: 106 MMHG | BODY MASS INDEX: 23.04 KG/M2

## 2020-09-14 DIAGNOSIS — B35.0 TINEA CAPITIS: ICD-10-CM

## 2020-09-14 DIAGNOSIS — G51.0 BELL'S PALSY: Primary | ICD-10-CM

## 2020-09-14 PROCEDURE — 99283 EMERGENCY DEPT VISIT LOW MDM: CPT

## 2020-09-14 PROCEDURE — 86618 LYME DISEASE ANTIBODY: CPT | Performed by: EMERGENCY MEDICINE

## 2020-09-14 RX ORDER — PREDNISONE 20 MG/1
60 TABLET ORAL DAILY
Qty: 21 TABLET | Refills: 0 | Status: SHIPPED | OUTPATIENT
Start: 2020-09-14 | End: 2020-09-22

## 2020-09-14 RX ORDER — ITRACONAZOLE 100 MG/1
200 CAPSULE ORAL DAILY
Qty: 28 CAPSULE | Refills: 0 | Status: SHIPPED | OUTPATIENT
Start: 2020-09-14 | End: 2020-09-28

## 2020-09-14 RX ORDER — SELENIUM SULFIDE 22.5 MG/ML
10 SHAMPOO TOPICAL
Qty: 1 BOTTLE | Refills: 0 | Status: SHIPPED | OUTPATIENT
Start: 2020-09-14 | End: 2020-09-28

## 2020-09-14 ASSESSMENT — ENCOUNTER SYMPTOMS
WEAKNESS: 0
ABDOMINAL PAIN: 0
COUGH: 0
NUMBNESS: 1
SHORTNESS OF BREATH: 0

## 2020-09-14 ASSESSMENT — MIFFLIN-ST. JEOR: SCORE: 1153.81

## 2020-09-14 NOTE — ED PROVIDER NOTES
"History     Chief Complaint:  Eye Problem and Facial Droop       HPI  Aracelis Merritt is a 55 year old year old female with a history of hypertension and hyperlipidemia who presents for evaluation of eye problem and facial droop. The patient states that since Thursday she has felt a tightness in her nose and a gradual onset of tingling sensation to the left side of her face. She had a sinus infection and relates her symptoms to that. She states it feels as if she \"had Botox done\" and cannot move her eyebrow. She says the whole left side of her face is numb and she is unable to closer her left eye. Of note, she also was at a cabin early August and had a tick on her arm, but she thinks it was caught pretty quickly. She also notes a redness to the back of her scalp. The patient denies loss of sense of taste, weakness in her arms or legs, cough, shortness of breath, chest pain, or abdominal pain.    Allergies:  Codeine       Medications:   Zithromax  Celexa  Cyanocobalamin  Losartan  Cholecalciferol      Medical History:   Anxiety  Cervical herniated disc  Hair loss, eye lids  History of migraine with aura  Hypertension  IBS  Menarche  Thrombocytopenia  Hyperlipidemia      Surgical History:  Colposcopy    Family History:   Hypertension  Cerebrovascular disease  Coronary artery disease  Depression/anxiety  Osteoporosis      Social History:  Patient was accompanied to the ED by her .   Smoking Status: Former Smoker    Type: Cigarettes    Quit 2013  Smokeless Tobacco: Never Used  Alcohol Use: Positive  Drug Use: Negative  Primary Care: Clinic, [facility]         Review of Systems   Respiratory: Negative for cough and shortness of breath.    Cardiovascular: Negative for chest pain.   Gastrointestinal: Negative for abdominal pain.   Neurological: Positive for numbness (left side of face). Negative for weakness.   All other systems reviewed and are negative.      Physical Exam     Patient Vitals for the " "past 24 hrs:   BP Temp Temp src Pulse Resp SpO2 Height Weight   09/14/20 1904 (!) 162/106 -- -- -- -- 95 % -- --   09/14/20 1737 (!) 117/113 97.5  F (36.4  C) Oral 74 18 96 % 1.6 m (5' 3\") 59 kg (130 lb)          Physical Exam  General: Alert and cooperative with exam. Patient in mild distress. Normal mentation.  Head:  Scalp is NC/AT. Area of skin flaking and erythema of her occipital scalp concerning for tinea capitis.  Eyes:  No scleral icterus, PERRL, EOMI   ENT:  The external nose and ears are normal. The oropharynx is normal and without erythema; mucus membranes are moist. Uvula midline, no evidence of deep space infection.  TMs clear  Neck:  Normal range of motion without rigidity.  CV:  Regular rate and rhythm.  Resp:  Breath sounds are clear bilaterally.    Non-labored, no retractions or accessory muscle use.  GI:  Abdomen is soft, no distension, no tenderness. No peritoneal signs  MS:  No lower extremity edema   Skin:  Warm and dry, No rash or lesions noted.  Neuro: Oriented x 3.     Strength and sensation grossly intact in all 4 extremities.      Cranial nerves 2-12 intact with exception of CN 7 on left, mild left facial droop and eyelid weakness consistent with Bell's.    GCS: 15    Emergency Department Course     Laboratory:  Laboratory findings were communicated with the patient who voiced understanding of the findings.    Lyme Disease Nancy with reflex to WB Serum: pending      Emergency Department Course:    1839 Nursing notes and vitals reviewed.    1841 I performed an exam of the patient as documented above.     1905 Findings and plan explained to the Patient. Patient discharged home with instructions regarding supportive care, medications, and reasons to return. The importance of close follow-up was reviewed. The patient was prescribed as below.    1919 IV was inserted and blood was drawn for laboratory testing, results above.    Impression & Plan     Medical Decision Making:  Patient presents for " evaluation of left-sided facial weakness. While the most likely etiology considered was Bell's Palsy, nonetheless a broad differential was considered including CVA (especially brainstem CVA), Lyme disease manifestation, neuropathy associated with HTN, HIV, DM, Benson-Hunt syndrome, lymphoma, sarcoidosis, brain tumor, etc.  Given the patient's exam including detailed neurologic exam, history and symptoms, age and risk factors, I believe this most likely represents Bell's palsy. I will initiate steroid therapy and will also test the patient for Lyme disease. I discussed with the patient normal Bell's Palsy care including eye moisture, taping eye shut at night, etc. We discussed the natural history of the disease and that not all patients make a full recovery from this. They will follow up with their doctor, earlier if eye pain develops.  Return here for progressive symptoms as this is unlikely but possible to represent early Guillain-Monroe syndrome.  Detailed discharge instructions given.  Additionally patient was provided oral antifungal as well as topical antifungal shampoo for likely tinea capitis.  Did recommend close follow-up with PCP to ensure resolution.    Diagnosis:     ICD-10-CM    1. Bell's palsy  G51.0 Lyme Disease Nancy with reflex to WB Serum   2. Tinea capitis  B35.0          Disposition:  Discharged to home.    Discharge Medications:  Discharge Medication List as of 9/14/2020  7:17 PM      START taking these medications    Details   itraconazole (SPORANOX) 100 MG capsule Take 2 capsules (200 mg) by mouth daily for 14 days, Disp-28 capsule,R-0, Local Print      predniSONE (DELTASONE) 20 MG tablet Take 3 tablets (60 mg) by mouth daily for 7 days, Disp-21 tablet,R-0, Local Print      Selenium Sulfide 2.25 % SHAM Externally apply 10 mLs topically three times a week for 14 days, Disp-1 Bottle,R-0, Local Print             Scribe Disclosure:  Galilea GARRISON, am serving as a scribe at 6:41 PM on 9/14/2020  to document services personally performed by Bautista Lino DO based on my observations and the provider's statements to me.      Bautista Lino DO  09/14/20 [id number]

## 2020-09-14 NOTE — TELEPHONE ENCOUNTER
Returned pt call    Cannot blink her left eye - had this prior to zithromax start but reporting this is not improved.  Still has a headache; pressure in her head  Recommended being seen by IM/PCP. She is on hold w  [facility] and she will proceed with f/u with them.    Pt verbalized understanding, in agreement with plan, and voiced no further questions.  Tabitha Cordon RN on 9/14/2020 at 10:19 AM

## 2020-09-14 NOTE — TELEPHONE ENCOUNTER
Patient started Z-jenn 9/10, symptoms not improved, cant shut left eye.  [facility], [address] [place].

## 2020-09-14 NOTE — ED TRIAGE NOTES
States that she cannot blink with her left eye. Patient also noticed a facial droop over the weekend.

## 2020-09-15 LAB — B BURGDOR IGG+IGM SER QL: 0.07 (ref 0–0.89)

## 2020-09-15 NOTE — DISCHARGE INSTRUCTIONS
Tape eyelid closed at night  Recommend over-the-counter artificial tears as needed  Shampoo and antifungal medication as prescribed

## 2020-09-22 ENCOUNTER — OFFICE VISIT (OUTPATIENT)
Dept: FAMILY MEDICINE | Facility: CLINIC | Age: 55
End: 2020-09-22
Payer: COMMERCIAL

## 2020-09-22 VITALS
DIASTOLIC BLOOD PRESSURE: 87 MMHG | RESPIRATION RATE: 16 BRPM | WEIGHT: 137 LBS | HEIGHT: 63 IN | BODY MASS INDEX: 24.27 KG/M2 | TEMPERATURE: 98.6 F | SYSTOLIC BLOOD PRESSURE: 125 MMHG | HEART RATE: 74 BPM | OXYGEN SATURATION: 97 %

## 2020-09-22 DIAGNOSIS — Z23 NEED FOR INFLUENZA VACCINATION: ICD-10-CM

## 2020-09-22 DIAGNOSIS — E78.5 HYPERLIPIDEMIA LDL GOAL <100: ICD-10-CM

## 2020-09-22 DIAGNOSIS — G51.0 BELL'S PALSY: ICD-10-CM

## 2020-09-22 DIAGNOSIS — I10 BENIGN ESSENTIAL HYPERTENSION: Primary | ICD-10-CM

## 2020-09-22 LAB
ANION GAP SERPL CALCULATED.3IONS-SCNC: 5 MMOL/L (ref 3–14)
BUN SERPL-MCNC: 25 MG/DL (ref 7–30)
CALCIUM SERPL-MCNC: 9 MG/DL (ref 8.5–10.1)
CHLORIDE SERPL-SCNC: 103 MMOL/L (ref 94–109)
CHOLEST SERPL-MCNC: 219 MG/DL
CO2 SERPL-SCNC: 30 MMOL/L (ref 20–32)
CREAT SERPL-MCNC: 0.73 MG/DL (ref 0.52–1.04)
CREAT UR-MCNC: 123 MG/DL
ERYTHROCYTE [DISTWIDTH] IN BLOOD BY AUTOMATED COUNT: 13.2 % (ref 10–15)
GFR SERPL CREATININE-BSD FRML MDRD: >90 ML/MIN/{1.73_M2}
GLUCOSE SERPL-MCNC: 100 MG/DL (ref 70–99)
HCT VFR BLD AUTO: 43.7 % (ref 35–47)
HDLC SERPL-MCNC: 52 MG/DL
HGB BLD-MCNC: 14.1 G/DL (ref 11.7–15.7)
LDLC SERPL CALC-MCNC: ABNORMAL MG/DL
LDLC SERPL DIRECT ASSAY-MCNC: 115 MG/DL
MCH RBC QN AUTO: 29.7 PG (ref 26.5–33)
MCHC RBC AUTO-ENTMCNC: 32.3 G/DL (ref 31.5–36.5)
MCV RBC AUTO: 92 FL (ref 78–100)
MICROALBUMIN UR-MCNC: 12 MG/L
MICROALBUMIN/CREAT UR: 9.51 MG/G CR (ref 0–25)
NONHDLC SERPL-MCNC: 167 MG/DL
PLATELET # BLD AUTO: 183 10E9/L (ref 150–450)
POTASSIUM SERPL-SCNC: 4.1 MMOL/L (ref 3.4–5.3)
RBC # BLD AUTO: 4.75 10E12/L (ref 3.8–5.2)
SODIUM SERPL-SCNC: 138 MMOL/L (ref 133–144)
TRIGL SERPL-MCNC: 607 MG/DL
TSH SERPL DL<=0.005 MIU/L-ACNC: 1.42 MU/L (ref 0.4–4)
WBC # BLD AUTO: 7.6 10E9/L (ref 4–11)

## 2020-09-22 RX ORDER — VIT C/B6/B5/MAGNESIUM/HERB 173 50-5-6-5MG
CAPSULE ORAL
COMMUNITY
End: 2020-11-30

## 2020-09-22 ASSESSMENT — MIFFLIN-ST. JEOR: SCORE: 1185.56

## 2020-09-22 NOTE — NURSING NOTE
"55 year old  Chief Complaint   Patient presents with     Hospital F/U     Pt. presents to the clinic today for an ER follow up and to establish care.        Blood pressure 125/87, pulse 74, temperature 98.6  F (37  C), temperature source Oral, resp. rate 16, height 1.6 m (5' 3\"), weight 62.1 kg (137 lb), last menstrual period 2015, SpO2 97 %, not currently breastfeeding. Body mass index is 24.27 kg/m .  BP completed using cuff size:    Patient Active Problem List   Diagnosis     Benign essential hypertension; goal < 140/90     Anxiety     Mixed hyperlipidemia     Abdominal bloating     Thrombocytopenia (H)       Wt Readings from Last 2 Encounters:   20 62.1 kg (137 lb)   20 59 kg (130 lb)     BP Readings from Last 3 Encounters:   20 125/87   20 (!) 162/106   19 120/80       Allergies   Allergen Reactions     Codeine Anxiety       Current Outpatient Medications   Medication     citalopram (CELEXA) 20 MG tablet     Cyanocobalamin (VITAMIN B 12 PO)     itraconazole (SPORANOX) 100 MG capsule     losartan (COZAAR) 50 MG tablet     multivitamin w/minerals (MULTI-VITAMIN) tablet     psyllium (METAMUCIL) 28.3 % POWD     Turmeric 500 MG CAPS     VITAMIN D, CHOLECALCIFEROL, PO     azithromycin (ZITHROMAX) 250 MG tablet     Saccharomyces boulardii (PROBIOTIC) 250 MG CAPS     Selenium Sulfide 2.25 % SHAM     No current facility-administered medications for this visit.        Social History     Tobacco Use     Smoking status: Former Smoker     Last attempt to quit: 2013     Years since quittin.0     Smokeless tobacco: Never Used   Substance Use Topics     Alcohol use: Yes     Alcohol/week: 6.0 standard drinks     Types: 6 Standard drinks or equivalent per week     Comment: 1 drink per week      Drug use: No       Honoring Choices - Health Care Directive Guide offered to patient at time of visit.    Health Maintenance Due   Topic Date Due     ADVANCE CARE PLANNING  1965     " ZOSTER IMMUNIZATION (1 of 2) 03/08/2015     PHQ-2  01/01/2020     INFLUENZA VACCINE (1) 09/01/2020       Immunization History   Administered Date(s) Administered     Influenza Vaccine IM > 6 months Valent IIV4 09/20/2016, 10/05/2017, 10/03/2018, 10/09/2019     MMR 01/01/1999, 08/04/1999     TD (ADULT, 7+) 08/20/2015     Tdap (Adacel,Boostrix) 01/18/2006       Lab Results   Component Value Date    PAP NIL 11/27/2019         Recent Labs   Lab Test 11/21/18  1332 12/27/17  0817 08/08/17  1350  08/20/15  1817   * 170*  --   --  115   HDL 43* 49*  --   --  39*   TRIG 86 231*  --   --  283*   ALT 36 26  --   --  24   CR 0.63 0.61  --    < > 0.63   GFRESTIMATED >90 >90  --    < > >90  Non  GFR Calc     GFRESTBLACK >90 >90  --    < > >90   GFR Calc     ALBUMIN 3.9 3.9  --   --  3.7   POTASSIUM 4.1 3.7  --    < > 4.0   TSH  --   --  1.32  --  1.84    < > = values in this interval not displayed.       PHQ-2 ( 1999 Pfizer) 9/22/2020 11/27/2019   Q1: Little interest or pleasure in doing things 0 0   Q2: Feeling down, depressed or hopeless 0 0   PHQ-2 Score 0 0   Q1: Little interest or pleasure in doing things - Several days   Q2: Feeling down, depressed or hopeless - Several days   PHQ-2 Score - 2       PHQ-9 SCORE 8/20/2015 1/21/2016 9/20/2016 11/27/2019   PHQ-9 Total Score - - - -   PHQ-9 Total Score 0 3 1 1       TASHIA-7 SCORE 11/27/2019   Total Score 4       No flowsheet data found.        Valeria Gonzalez CMA  September 22, 2020 3:47 PM

## 2020-09-22 NOTE — PATIENT INSTRUCTIONS
History of Hypertension  Continue current medications  Labs today: CBC, TSH with reflex T4, Basic metabolic panel, random urine albumin    Hyperlipidemia  Lipids    Low fat, low salt diet, regular exercise. Maintain weight    Bell's Palsy  Symptoms appear to have resolved. If symptoms worsen or persist, please return    Relaxation  Headspace annemarie for mobile phone for mindfulness and relaxation  Daily yoga: You tube has a lot of yoga options that are short 15-20 minutes or up to 1 hour.   Yoga with Sarah, Morning yoga  Try to work a short routine or walking into your daily routine and gradually increase    Your lab results will be released to you on My Chart    Flu vaccine was given today
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
## 2020-09-23 DIAGNOSIS — E78.5 HYPERLIPIDEMIA LDL GOAL <100: Primary | ICD-10-CM

## 2020-09-24 ENCOUNTER — TELEPHONE (OUTPATIENT)
Dept: FAMILY MEDICINE | Facility: CLINIC | Age: 55
End: 2020-09-24

## 2020-09-24 DIAGNOSIS — E78.2 ELEVATED TRIGLYCERIDES WITH HIGH CHOLESTEROL: Primary | ICD-10-CM

## 2020-09-24 NOTE — TELEPHONE ENCOUNTER
TC to patient re: lipid panel and elevated TG. Did a nonfasting lipid panel but TG came back 607. According to guidelines, if TG > 200 on non-fasting lipid profile, should repeat with fasting lipid profile. Patient notified. With level this high there is concern for pancreatitis, although patient denies having any abdominal pain. She does note feeling as though she has gained weight in the abdominal area.  BS was also slightly elevated at 100.  Will also check lipase and A1c. Patient will schedule lab visit and then we will follow up after that. Anuradha BLANCO CNP

## 2020-09-30 DIAGNOSIS — E78.5 HYPERLIPIDEMIA LDL GOAL <100: ICD-10-CM

## 2020-09-30 DIAGNOSIS — E78.2 ELEVATED TRIGLYCERIDES WITH HIGH CHOLESTEROL: ICD-10-CM

## 2020-09-30 LAB
CHOLEST SERPL-MCNC: 199 MG/DL
HBA1C MFR BLD: 5.2 % (ref 0–5.6)
HDLC SERPL-MCNC: 44 MG/DL
LDLC SERPL CALC-MCNC: 103 MG/DL
LIPASE SERPL-CCNC: 147 U/L (ref 73–393)
NONHDLC SERPL-MCNC: 155 MG/DL
TRIGL SERPL-MCNC: 259 MG/DL

## 2020-09-30 PROCEDURE — 36415 COLL VENOUS BLD VENIPUNCTURE: CPT | Performed by: NURSE PRACTITIONER

## 2020-09-30 PROCEDURE — 83690 ASSAY OF LIPASE: CPT | Performed by: NURSE PRACTITIONER

## 2020-09-30 PROCEDURE — 80061 LIPID PANEL: CPT | Performed by: NURSE PRACTITIONER

## 2020-09-30 PROCEDURE — 83036 HEMOGLOBIN GLYCOSYLATED A1C: CPT | Performed by: NURSE PRACTITIONER

## 2020-11-30 ENCOUNTER — OFFICE VISIT (OUTPATIENT)
Dept: OBGYN | Facility: CLINIC | Age: 55
End: 2020-11-30
Payer: COMMERCIAL

## 2020-11-30 VITALS
HEIGHT: 63 IN | BODY MASS INDEX: 23.92 KG/M2 | DIASTOLIC BLOOD PRESSURE: 72 MMHG | WEIGHT: 135 LBS | SYSTOLIC BLOOD PRESSURE: 118 MMHG

## 2020-11-30 DIAGNOSIS — Z01.419 ENCOUNTER FOR GYNECOLOGICAL EXAMINATION WITHOUT ABNORMAL FINDING: Primary | ICD-10-CM

## 2020-11-30 DIAGNOSIS — F41.9 ANXIETY: ICD-10-CM

## 2020-11-30 DIAGNOSIS — Z20.822 EXPOSURE TO COVID-19 VIRUS: ICD-10-CM

## 2020-11-30 DIAGNOSIS — N90.4 LICHEN SCLEROSUS ET ATROPHICUS OF THE VULVA: ICD-10-CM

## 2020-11-30 DIAGNOSIS — I10 ESSENTIAL HYPERTENSION, BENIGN: Chronic | ICD-10-CM

## 2020-11-30 PROCEDURE — 87624 HPV HI-RISK TYP POOLED RSLT: CPT | Performed by: NURSE PRACTITIONER

## 2020-11-30 PROCEDURE — 86769 SARS-COV-2 COVID-19 ANTIBODY: CPT | Performed by: NURSE PRACTITIONER

## 2020-11-30 PROCEDURE — 36415 COLL VENOUS BLD VENIPUNCTURE: CPT | Performed by: NURSE PRACTITIONER

## 2020-11-30 PROCEDURE — 99396 PREV VISIT EST AGE 40-64: CPT | Performed by: NURSE PRACTITIONER

## 2020-11-30 PROCEDURE — G0145 SCR C/V CYTO,THINLAYER,RESCR: HCPCS | Performed by: NURSE PRACTITIONER

## 2020-11-30 RX ORDER — CLOBETASOL PROPIONATE 0.5 MG/G
OINTMENT TOPICAL 2 TIMES DAILY
Qty: 30 G | Refills: 1 | Status: SHIPPED | OUTPATIENT
Start: 2020-11-30 | End: 2023-03-14

## 2020-11-30 RX ORDER — CITALOPRAM HYDROBROMIDE 20 MG/1
20 TABLET ORAL DAILY
Qty: 90 TABLET | Refills: 3 | Status: SHIPPED | OUTPATIENT
Start: 2020-11-30 | End: 2021-11-29

## 2020-11-30 RX ORDER — LOSARTAN POTASSIUM 50 MG/1
50 TABLET ORAL DAILY
Qty: 90 TABLET | Refills: 3 | Status: SHIPPED | OUTPATIENT
Start: 2020-11-30 | End: 2021-11-22

## 2020-11-30 ASSESSMENT — ANXIETY QUESTIONNAIRES
5. BEING SO RESTLESS THAT IT IS HARD TO SIT STILL: NOT AT ALL
GAD7 TOTAL SCORE: 0
7. FEELING AFRAID AS IF SOMETHING AWFUL MIGHT HAPPEN: NOT AT ALL
1. FEELING NERVOUS, ANXIOUS, OR ON EDGE: NOT AT ALL
3. WORRYING TOO MUCH ABOUT DIFFERENT THINGS: NOT AT ALL
IF YOU CHECKED OFF ANY PROBLEMS ON THIS QUESTIONNAIRE, HOW DIFFICULT HAVE THESE PROBLEMS MADE IT FOR YOU TO DO YOUR WORK, TAKE CARE OF THINGS AT HOME, OR GET ALONG WITH OTHER PEOPLE: NOT DIFFICULT AT ALL
6. BECOMING EASILY ANNOYED OR IRRITABLE: NOT AT ALL
2. NOT BEING ABLE TO STOP OR CONTROL WORRYING: NOT AT ALL

## 2020-11-30 ASSESSMENT — PATIENT HEALTH QUESTIONNAIRE - PHQ9
SUM OF ALL RESPONSES TO PHQ QUESTIONS 1-9: 0
5. POOR APPETITE OR OVEREATING: NOT AT ALL

## 2020-11-30 ASSESSMENT — MIFFLIN-ST. JEOR: SCORE: 1176.49

## 2020-11-30 NOTE — PROGRESS NOTES
Aracelis is a 55 year old  female who presents for annual exam.     Besides routine health maintenance, would like the COVID anti-body screening.    HPI:    Pt present for annual examination. Had Bell's Palsy in September that has since resolved.  Has seen a PCP for this and her HTN management- recommended to continue Losartan for HTN management.  Does have anxiety that is doing well on the increased dose of Celexa at 20mg (increased last year).  No concerns or complaints, is happy with this medication.     Does report concerns of a vaginal tear, hx of HSV outbreak in her .  Has never been on medication and has never had another outbreak.  Also reports a reddened skin lesion on her mons pubis that is not painful, tender, or oozing.  Was diagnosed with psoriasis on her scalp in September.      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     The patient's PCP is BELLA Comer CNP.      GYNECOLOGIC HISTORY:    Patient's last menstrual period was 2015.  She  reports that she quit smoking about 7 years ago. She has never used smokeless tobacco.  Patient is sexually active.    Last PHQ-9 score on record =   PHQ-9 SCORE 2020   PHQ-9 Total Score -   PHQ-9 Total Score 0     Last GAD7 score on record =   TASHIA-7 SCORE 2020   Total Score 0     Alcohol Score = 3    HEALTH MAINTENANCE:  Cholesterol: followed by primary care provider  Recent Labs   Lab Test 20  0834 20  1600 08/20/15  1817 08/20/15  1817   CHOL 199 219*   < > 211*   HDL 44* 52   < > 39*   * Cannot estimate LDL when triglyceride exceeds 400 mg/dL  115*   < > 115   TRIG 259* 607*   < > 283*   CHOLHDLRATIO  --   --   --  5.4*    < > = values in this interval not displayed.     TSH   Date Value Ref Range Status   2020 1.42 0.40 - 4.00 mU/L Final     Last Mammo: 19, Result: Normal, Next Mammo: scheduled 21  Pap:   Lab  Results   Component Value Date    PAP NIL, NEG-HPV 2019    PAP NIL, NEG-HPV 2018    PAP NIL, NEG-HPV 2015   Colonoscopy:  3/27/17, Result: polyp, Next Colonoscopy: 5 years.  Dexa:  never  Health maintenance updated:  yes    HISTORY:  OB History    Para Term  AB Living   0 0 0 0 0 0   SAB TAB Ectopic Multiple Live Births   0 0 0 0 0       Patient Active Problem List   Diagnosis     Benign essential hypertension; goal < 140/90     Anxiety     Mixed hyperlipidemia     Abdominal bloating     Thrombocytopenia (H)     Bell's palsy     Past Surgical History:   Procedure Laterality Date     NO HISTORY OF SURGERY        Social History     Tobacco Use     Smoking status: Former Smoker     Quit date: 2013     Years since quittin.2     Smokeless tobacco: Never Used   Substance Use Topics     Alcohol use: Yes     Alcohol/week: 6.0 standard drinks     Types: 6 Standard drinks or equivalent per week     Comment: 1 drink per week       Problem (# of Occurrences) Relation (Name,Age of Onset)    Alzheimer Disease (1) Mother    Cerebrovascular Disease (1) Mother    Chemical Addiction (1) Father    Coronary Artery Disease (1) Father    Depression/Anxiety (2) Father, Brother: all four brothers    Hypertension (1) Mother    No Known Problems (4) Sister, Maternal Grandfather, Paternal Grandmother, Other    Osteoporosis (1) Maternal Grandmother            Current Outpatient Medications   Medication Sig     citalopram (CELEXA) 20 MG tablet Take 1 tablet (20 mg) by mouth daily     clobetasol (TEMOVATE) 0.05 % external ointment Apply topically 2 times daily Externally to affected area for 2 weeks, than off for 2 weeks and may repeat.     Cyanocobalamin (VITAMIN B 12 PO) Take 1,000 mcg by mouth     losartan (COZAAR) 50 MG tablet Take 1 tablet (50 mg) by mouth daily     multivitamin w/minerals (MULTI-VITAMIN) tablet Take 1 tablet by mouth daily     psyllium (METAMUCIL) 28.3 % POWD Take 1 teaspoonful by  "mouth 2 times daily     Saccharomyces boulardii (PROBIOTIC) 250 MG CAPS Take by mouth daily as needed (30 Billion CFU's)     VITAMIN D, CHOLECALCIFEROL, PO Take 1,000 Units by mouth daily     No current facility-administered medications for this visit.      Allergies   Allergen Reactions     Codeine Anxiety       Past medical, surgical, social and family histories were reviewed and updated in EPIC.    ROS:   12 point review of systems negative other than symptoms noted below or in the HPI.  No urinary frequency or dysuria, bladder or kidney problems    EXAM:  /72   Ht 1.6 m (5' 3\")   Wt 61.2 kg (135 lb)   LMP 08/06/2015   BMI 23.91 kg/m     BMI: Body mass index is 23.91 kg/m .    PHYSICAL EXAM:  Constitutional:   Appearance: Well nourished, well developed, alert, in no acute distress  Neck:  Lymph Nodes:  No lymphadenopathy present    Thyroid:  Gland size normal, nontender, no nodules or masses present  on palpation  Chest:  Respiratory Effort:  Breathing unlabored  Cardiovascular:    Heart: Auscultation:  Regular rate, normal rhythm, no murmurs present  Breasts: Inspection of Breasts:  No lymphadenopathy present., Palpation of Breasts and Axillae:  No masses present on palpation, no breast tenderness., Axillary Lymph Nodes:  No lymphadenopathy present. and No nodularity, asymmetry or nipple discharge bilaterally.  Gastrointestinal:   Abdominal Examination:  Abdomen nontender to palpation, tone normal without rigidity or guarding, no masses present, umbilicus without lesions   Liver and Spleen:  No hepatomegaly present, liver nontender to palpation    Hernias:  No hernias present  Lymphatic: Lymph Nodes:  No other lymphadenopathy present  Skin:  General Inspection:  No rashes present, no lesions present, no areas of  discoloration  Neurologic:    Mental Status:  Oriented X3.  Normal strength and tone, sensory exam                grossly normal, mentation intact and speech normal.    Psychiatric:   " Mentation appears normal and affect normal/bright.         Pelvic Exam:  External Genitalia:     No discharge present, no tenderness present, erythematous patches around left labia majora into the  perineum and around the rectum.  Skin fissuring at crease of labias.     Reddened scaly patch approximately 1.5 cm on the right side of the mons pubis- appears to be  psoriasis.    Vagina:     Normal vaginal vault without central or paravaginal defects, no discharge present, no inflammatory lesions present, no masses present  Bladder:     Nontender to palpation  Urethra:   Urethral Body:  Urethra palpation normal, urethra structural support normal   Urethral Meatus:  No erythema or lesions present  Cervix:     Appearance healthy, no lesions present, nontender to palpation, no bleeding present  Uterus:     Uterus: firm, normal sized and nontender, anteverted in position.   Adnexa:     No adnexal tenderness present, no adnexal masses present  Perineum:     erythematous patches around left labia majora into the perineum and around the rectum.   Anus:     Hemorrhoids present, fissuring noted to the posterior rectal tissue, erythematous patches present around rectum.  Inguinal Lymph Nodes:     No lymphadenopathy present  Pubic Hair:     Normal pubic hair distribution for age  Genitalia and Groin:     No rashes present, no lesions present, no areas of discoloration, no masses present      COUNSELING:   Reviewed preventive health counseling, as reflected in patient instructions       Regular exercise       Healthy diet/nutrition       (Neelam)menopause management    BMI: Body mass index is 23.91 kg/m .      ASSESSMENT:  55 year old female with satisfactory annual exam.    ICD-10-CM    1. Encounter for gynecological examination without abnormal finding  Z01.419 Pap imaged thin layer screen with HPV - recommended age 30 - 65     HPV High Risk Types DNA Cervical   2. Anxiety  F41.9 citalopram (CELEXA) 20 MG tablet   3. Essential  hypertension, benign  I10 losartan (COZAAR) 50 MG tablet   4. Exposure to COVID-19 virus  Z20.828 COVID-19 Virus (Coronavirus) Antibody & Titer Reflex   5. Lichen sclerosus et atrophicus of the vulva  N90.4 clobetasol (TEMOVATE) 0.05 % external ointment       PLAN:    Pap smear today.    Continue celexa 20 mg for mood. Mood is stable. Refill of losartan for HTN management as recommended by PCP.       Clobetasol ointment to be applied externally two times daily for 2 weeks, then off 2 weeks to area around pernieum and rectum, for possible lichen sclerous   Follow up in clinic after treatment.     Is seeing a dermatologist on Wednesday for follow up of her psoriasis diagnosis.  Recommended having dermatology assess the vaginal area as well.     Return in 4 weeks for follow up, 1 year for annual, and as needed for concerns.     I, Moni Garnett completed the PFSH and ROS. I then acted as a scribe for BELLA Nava CNP for the remainder of the visit.  Moni Garnett RN, BSN NP Student    I agree with the PFSH and ROS as completed by the [facility]NP Student, except for changes made by me.  The remainder of the encounter was performed by me and scribed by the [facility]NP Student.  The scribed note accurately reflects my personal services and decisions made by me.    BELLA Barney CNP

## 2020-12-01 ASSESSMENT — ANXIETY QUESTIONNAIRES: GAD7 TOTAL SCORE: 0

## 2020-12-02 LAB
COVID-19 SPIKE RBD ABY TITER: NORMAL
COVID-19 SPIKE RBD ABY: NEGATIVE

## 2020-12-03 NOTE — RESULT ENCOUNTER NOTE
Aracelis      Your results are normal.  If further questions please give me a call.    Valeria Gamble RNC

## 2020-12-04 LAB
COPATH REPORT: NORMAL
PAP: NORMAL

## 2021-07-04 NOTE — PROGRESS NOTES
Death Pronouncement     Examination:  Pupils fixed and dilated, no occulocephalic reflex   Heart tones absent  Lung sounds absent  No spontanous breath sounds.     I pronounced the patient dead [id number] on 7/3/2021     Family was at the bedside. RN to notify [facility] office as she was here less than 24 hrs.    T

## 2021-08-03 ENCOUNTER — HOSPITAL ENCOUNTER (OUTPATIENT)
Dept: MAMMOGRAPHY | Facility: CLINIC | Age: 56
Discharge: HOME | End: 2021-08-03
Attending: NURSE PRACTITIONER | Admitting: NURSE PRACTITIONER
Payer: COMMERCIAL

## 2021-08-03 DIAGNOSIS — Z12.31 VISIT FOR SCREENING MAMMOGRAM: ICD-10-CM

## 2021-08-03 PROCEDURE — 77063 BREAST TOMOSYNTHESIS BI: CPT

## 2021-09-04 ENCOUNTER — HEALTH MAINTENANCE LETTER (OUTPATIENT)
Age: 56
End: 2021-09-04

## 2021-11-21 DIAGNOSIS — I10 ESSENTIAL HYPERTENSION, BENIGN: Chronic | ICD-10-CM

## 2021-11-22 RX ORDER — LOSARTAN POTASSIUM 50 MG/1
TABLET ORAL
Qty: 30 TABLET | Refills: 0 | Status: SHIPPED | OUTPATIENT
Start: 2021-11-22 | End: 2021-12-27

## 2021-11-22 NOTE — TELEPHONE ENCOUNTER
"Requested Prescriptions   Pending Prescriptions Disp Refills     losartan (COZAAR) 50 MG tablet [Pharmacy Med Name: Losartan Potassium Oral Tablet 50 MG] 90 tablet 0     Sig: TAKE ONE TABLET BY MOUTH ONCE DAILY       Angiotensin-II Receptors Failed - 11/21/2021 10:27 AM        Failed - Normal serum creatinine on file in past 12 months     Recent Labs   Lab Test 09/22/20  1600   CR 0.73       Ok to refill medication if creatinine is low          Failed - Normal serum potassium on file in past 12 months     Recent Labs   Lab Test 09/22/20  1600   POTASSIUM 4.1                    Passed - Last blood pressure under 140/90 in past 12 months     BP Readings from Last 3 Encounters:   11/30/20 118/72   09/22/20 125/87   09/14/20 (!) 162/106                 Passed - Recent (12 mo) or future (30 days) visit within the authorizing provider's specialty     Patient has had an office visit with the authorizing provider or a provider within the authorizing providers department within the previous 12 mos or has a future within next 30 days. See \"Patient Info\" tab in inbasket, or \"Choose Columns\" in Meds & Orders section of the refill encounter.              Passed - Medication is active on med list        Passed - Patient is age 18 or older        Passed - No active pregnancy on record        Passed - No positive pregnancy test in past 12 months           Refill approved for one month  Appointment needed for further refills  Adali Milian RN on 11/22/2021 at 8:28 AM    "

## 2021-11-29 DIAGNOSIS — F41.9 ANXIETY: ICD-10-CM

## 2021-11-29 RX ORDER — CITALOPRAM HYDROBROMIDE 20 MG/1
TABLET ORAL
Qty: 90 TABLET | Refills: 0 | Status: SHIPPED | OUTPATIENT
Start: 2021-11-29 | End: 2022-02-28

## 2021-11-29 NOTE — TELEPHONE ENCOUNTER
"Requested Prescriptions   Pending Prescriptions Disp Refills     citalopram (CELEXA) 20 MG tablet [Pharmacy Med Name: Citalopram Hydrobromide Oral Tablet 20 MG] 90 tablet 0     Sig: TAKE ONE TABLET BY MOUTH ONCE DAILY       SSRIs Protocol Passed - 11/29/2021 12:52 PM        Passed - Recent (12 mo) or future (30 days) visit within the authorizing provider's specialty     Patient has had an office visit with the authorizing provider or a provider within the authorizing providers department within the previous 12 mos or has a future within next 30 days. See \"Patient Info\" tab in inbasket, or \"Choose Columns\" in Meds & Orders section of the refill encounter.              Passed - Medication is active on med list        Passed - Patient is age 18 or older        Passed - No active pregnancy on record        Passed - No positive pregnancy test in last 12 months           Last Written Prescription Date:  11/30/2020  Last Fill Quantity: 90,  # refills: 3   Last office visit: 11/30/2020 with prescribing provider:  ELENA Gamble NP   Future Office Visit:      Medication is being filled for 1 time refill only due to:  Patient needs to be seen because it has been more than one year since last visit.  Tabitha Cordon RN on 11/29/2021 at 12:55 PM        "

## 2021-12-25 DIAGNOSIS — I10 ESSENTIAL HYPERTENSION, BENIGN: Chronic | ICD-10-CM

## 2021-12-27 RX ORDER — LOSARTAN POTASSIUM 50 MG/1
TABLET ORAL
Qty: 30 TABLET | Refills: 0 | Status: SHIPPED | OUTPATIENT
Start: 2021-12-27 | End: 2022-02-28

## 2021-12-27 NOTE — TELEPHONE ENCOUNTER
"Requested Prescriptions   Pending Prescriptions Disp Refills     losartan (COZAAR) 50 MG tablet [Pharmacy Med Name: Losartan Potassium Oral Tablet 50 MG] 30 tablet 0     Sig: TAKE ONE TABLET BY MOUTH ONCE DAILY       Angiotensin-II Receptors Failed - 12/25/2021  9:04 AM        Failed - Last blood pressure under 140/90 in past 12 months     BP Readings from Last 3 Encounters:   11/30/20 118/72   09/22/20 125/87   09/14/20 (!) 162/106                 Failed - Normal serum creatinine on file in past 12 months     Recent Labs   Lab Test 09/22/20  1600   CR 0.73       Ok to refill medication if creatinine is low          Failed - Normal serum potassium on file in past 12 months     Recent Labs   Lab Test 09/22/20  1600   POTASSIUM 4.1                    Passed - Recent (12 mo) or future (30 days) visit within the authorizing provider's specialty     Patient has had an office visit with the authorizing provider or a provider within the authorizing providers department within the previous 12 mos or has a future within next 30 days. See \"Patient Info\" tab in inbasket, or \"Choose Columns\" in Meds & Orders section of the refill encounter.              Passed - Medication is active on med list        Passed - Patient is age 18 or older        Passed - No active pregnancy on record        Passed - No positive pregnancy test in past 12 months           Last Written Prescription Date:  11/22/20  Last Fill Quantity: 30,  # refills: 0   Last office visit: 11/30/2020 with prescribing provider:  Tye   Future Office Visit:   Next 5 appointments (look out 90 days)    Jan 17, 2022  3:20 PM  PHYSICAL with BELLA Barney CNP  [facility] [place] ([facility] ) [address] [id number] [phone]         Medication is being filled for 1 time refill only due to:  Patient needs to be seen because it has been more than one year since last " visit.  Appointment scheduled  Tanesha Guzman RN on 12/27/2021 at 5:28 AM

## 2022-01-07 NOTE — PROGRESS NOTES
Aracelis is a 56 year old  female who presents for annual exam.     Besides routine health maintenance, she has no other health concerns today .    HPI: here for annual exam.  She is menopausal, no HRT.  Still teaching school.  Has had vaccinations and booster.  Has no other concerns today.    The patient's PCP is BELLA Comer CNP.        GYNECOLOGIC HISTORY:    Patient's last menstrual period was 2015.      Her current contraception method is: menopause.  She  reports that she quit smoking about 8 years ago. She has never used smokeless tobacco.    Patient is sexually active.  STD testing offered?  Declined  Last PHQ-9 score on record =   PHQ-9 SCORE 2022   PHQ-9 Total Score -   PHQ-9 Total Score 2     Last GAD7 score on record =   TASHIA-7 SCORE 2022   Total Score 2     Alcohol Score = 6    HEALTH MAINTENANCE:  Cholesterol:   Recent Labs   Lab Test 20  0834 20  1600 17  0817 08/20/15  1817   CHOL 199 219*   < > 211*   HDL 44* 52   < > 39*   * Cannot estimate LDL when triglyceride exceeds 400 mg/dL  115*   < > 115   TRIG 259* 607*   < > 283*   CHOLHDLRATIO  --   --   --  5.4*    < > = values in this interval not displayed.   Last Mammo: 8/3/21, Result: Normal, Next Mammo: 2022   Pap:   Lab Results   Component Value Date    PAP NIL, HPV- 2020    PAP NIL 2019    PAP NIL 2018     Colonoscopy:  3/27/17, Result: Abnormal, Next Colonoscopy: Due in March.  Dexa:  NA    Health maintenance updated:  yes    HISTORY:  OB History    Para Term  AB Living   0 0 0 0 0 0   SAB IAB Ectopic Multiple Live Births   0 0 0 0 0       Patient Active Problem List   Diagnosis     Benign essential hypertension; goal < 140/90     Anxiety     Mixed hyperlipidemia     Abdominal bloating     Thrombocytopenia (H)     Bell's palsy     Past Surgical History:   Procedure Laterality Date     NO HISTORY OF SURGERY        Social History     Tobacco Use      "Smoking status: Former Smoker     Quit date: 2013     Years since quittin.4     Smokeless tobacco: Never Used   Substance Use Topics     Alcohol use: Yes     Alcohol/week: 6.0 standard drinks     Types: 6 Standard drinks or equivalent per week     Comment: 1 drink per week       Problem (# of Occurrences) Relation (Name,Age of Onset)    Alzheimer Disease (1) Mother    Cerebrovascular Disease (1) Mother    Chemical Addiction (1) Father    Coronary Artery Disease (1) Father    Depression/Anxiety (2) Father, Brother: all four brothers    Hypertension (1) Mother    No Known Problems (5) Sister, Maternal Grandfather, Paternal Grandmother, Paternal Grandfather, Other    Osteoporosis (1) Maternal Grandmother            Current Outpatient Medications   Medication Sig     apremilast (OTEZLA) 30 MG tablet      augmented betamethasone dipropionate (DIPROLENE) 0.05 % external lotion      citalopram (CELEXA) 20 MG tablet TAKE ONE TABLET BY MOUTH ONCE DAILY     Cyanocobalamin (VITAMIN B 12 PO) Take 1,000 mcg by mouth     losartan (COZAAR) 50 MG tablet TAKE ONE TABLET BY MOUTH ONCE DAILY     multivitamin w/minerals (MULTI-VITAMIN) tablet Take 1 tablet by mouth daily     psyllium (METAMUCIL) 28.3 % POWD Take 1 teaspoonful by mouth 2 times daily     VITAMIN D, CHOLECALCIFEROL, PO Take 1,000 Units by mouth daily     clobetasol (TEMOVATE) 0.05 % external ointment Apply topically 2 times daily Externally to affected area for 2 weeks, than off for 2 weeks and may repeat. (Patient not taking: Reported on 2022)     No current facility-administered medications for this visit.     Allergies   Allergen Reactions     Codeine Anxiety       Past medical, surgical, social and family histories were reviewed and updated in EPIC.    ROS:   12 point review of systems negative other than symptoms noted below or in the HPI.      EXAM:  /62   Pulse 68   Ht 1.6 m (5' 3\")   Wt 57.8 kg (127 lb 6.4 oz)   LMP 2015   BMI 22.57 " kg/m     BMI: Body mass index is 22.57 kg/m .    PHYSICAL EXAM:  Constitutional:   Appearance: Well nourished, well developed, alert, in no acute distress  Neck:  Lymph Nodes:  No lymphadenopathy present    Thyroid:  Gland size normal, nontender, no nodules or masses present  on palpation  Chest:  Respiratory Effort:  Breathing unlabored  Cardiovascular:    Heart: Auscultation:  Regular rate, normal rhythm, no murmurs present  Breasts: Inspection of Breasts:  No lymphadenopathy present., Palpation of Breasts and Axillae:  No masses present on palpation, no breast tenderness., Axillary Lymph Nodes:  No lymphadenopathy present. and No nodularity, asymmetry or nipple discharge bilaterally.  Gastrointestinal:   Abdominal Examination:  Abdomen nontender to palpation, tone normal without rigidity or guarding, no masses present, umbilicus without lesions   Liver and Spleen:  No hepatomegaly present, liver nontender to palpation    Hernias:  No hernias present  Lymphatic: Lymph Nodes:  No other lymphadenopathy present  Skin:  General Inspection:  No rashes present, no lesions present, no areas of  discoloration  Neurologic:    Mental Status:  Oriented X3.  Normal strength and tone, sensory exam                grossly normal, mentation intact and speech normal.    Psychiatric:   Mentation appears normal and affect normal/bright.         Pelvic Exam:  External Genitalia:     Normal appearance for age, no discharge present, no tenderness present, no inflammatory lesions present, color normal  Vagina:     Normal vaginal vault without central or paravaginal defects, no discharge present, no inflammatory lesions present, no masses present  Bladder:     Nontender to palpation  Urethra:   Urethral Body:  Urethra palpation normal, urethra structural support normal   Urethral Meatus:  No erythema or lesions present  Cervix:     Appearance healthy, no lesions present, nontender to palpation, no bleeding present  Uterus:     Uterus:  firm, normal sized and nontender, anteverted in position.   Adnexa:     No adnexal tenderness present, no adnexal masses present  Perineum:     Perineum within normal limits, no evidence of trauma, no rashes or skin lesions present  Anus:     Anus within normal limits, no hemorrhoids present  Inguinal Lymph Nodes:     No lymphadenopathy present  Pubic Hair:     Normal pubic hair distribution for age  Genitalia and Groin:     No rashes present, no lesions present, no areas of discoloration, no masses present      COUNSELING:   Reviewed preventive health counseling, as reflected in patient instructions       Regular exercise       Healthy diet/nutrition       Osteoporosis prevention/bone health       Colon cancer screening       (Neelam)menopause management    BMI: Body mass index is 22.57 kg/m .      ASSESSMENT:  56 year old female with satisfactory annual exam.    ICD-10-CM    1. Encounter for gynecological examination without abnormal finding  Z01.419 Pap thin layer screen with HPV - recommended age 30 - 65 years   2. Colon cancer screening  Z12.11 Adult Gastro Ref - Procedure Only   3. Screening for ischemic heart disease  Z13.6 Lipid Profile   4. Screening for diabetes mellitus  Z13.1 Comprehensive metabolic panel       PLAN:  Normal Gyn exam.  Will return fasting for blood work.  Return 1 year for annual.  Pap due in 2024.    BELLA Barney CNP

## 2022-01-17 ENCOUNTER — OFFICE VISIT (OUTPATIENT)
Dept: OBGYN | Facility: CLINIC | Age: 57
End: 2022-01-17
Payer: COMMERCIAL

## 2022-01-17 VITALS
WEIGHT: 127.4 LBS | DIASTOLIC BLOOD PRESSURE: 62 MMHG | HEIGHT: 63 IN | BODY MASS INDEX: 22.57 KG/M2 | SYSTOLIC BLOOD PRESSURE: 112 MMHG | HEART RATE: 68 BPM

## 2022-01-17 DIAGNOSIS — Z13.6 SCREENING FOR ISCHEMIC HEART DISEASE: ICD-10-CM

## 2022-01-17 DIAGNOSIS — Z13.1 SCREENING FOR DIABETES MELLITUS: ICD-10-CM

## 2022-01-17 DIAGNOSIS — Z01.419 ENCOUNTER FOR GYNECOLOGICAL EXAMINATION WITHOUT ABNORMAL FINDING: Primary | ICD-10-CM

## 2022-01-17 DIAGNOSIS — Z12.11 COLON CANCER SCREENING: ICD-10-CM

## 2022-01-17 PROCEDURE — 99396 PREV VISIT EST AGE 40-64: CPT | Performed by: NURSE PRACTITIONER

## 2022-01-17 RX ORDER — BETAMETHASONE DIPROPIONATE 0.5 MG/ML
LOTION, AUGMENTED TOPICAL
COMMUNITY
Start: 2021-11-29 | End: 2023-03-14

## 2022-01-17 ASSESSMENT — ANXIETY QUESTIONNAIRES
2. NOT BEING ABLE TO STOP OR CONTROL WORRYING: SEVERAL DAYS
IF YOU CHECKED OFF ANY PROBLEMS ON THIS QUESTIONNAIRE, HOW DIFFICULT HAVE THESE PROBLEMS MADE IT FOR YOU TO DO YOUR WORK, TAKE CARE OF THINGS AT HOME, OR GET ALONG WITH OTHER PEOPLE: NOT DIFFICULT AT ALL
6. BECOMING EASILY ANNOYED OR IRRITABLE: NOT AT ALL
5. BEING SO RESTLESS THAT IT IS HARD TO SIT STILL: NOT AT ALL
7. FEELING AFRAID AS IF SOMETHING AWFUL MIGHT HAPPEN: NOT AT ALL
1. FEELING NERVOUS, ANXIOUS, OR ON EDGE: NOT AT ALL
3. WORRYING TOO MUCH ABOUT DIFFERENT THINGS: SEVERAL DAYS
GAD7 TOTAL SCORE: 2

## 2022-01-17 ASSESSMENT — PATIENT HEALTH QUESTIONNAIRE - PHQ9
5. POOR APPETITE OR OVEREATING: NOT AT ALL
SUM OF ALL RESPONSES TO PHQ QUESTIONS 1-9: 2

## 2022-01-17 ASSESSMENT — MIFFLIN-ST. JEOR: SCORE: 1137.01

## 2022-01-17 NOTE — TELEPHONE ENCOUNTER
Reason for Call:  Medication or medication refill:    Do you use a [facility]?  Name of the pharmacy and phone number for the current request:       [id number] - [place] [id number]        Name of the medication requested:  Citalopram 10mg    Other request: Patient wants to go back to this RX    Can we leave a detailed message on this number? YES    Phone number patient can be reached at: Home number on file [phone] (home)    Best Time: anytime    Call taken on 8/9/2019 at 9:48 AM by Perez Mitchell       No

## 2022-01-18 ASSESSMENT — ANXIETY QUESTIONNAIRES: GAD7 TOTAL SCORE: 2

## 2022-02-21 ENCOUNTER — LAB (OUTPATIENT)
Dept: LAB | Facility: CLINIC | Age: 57
End: 2022-02-21
Payer: COMMERCIAL

## 2022-02-21 DIAGNOSIS — Z13.1 SCREENING FOR DIABETES MELLITUS: ICD-10-CM

## 2022-02-21 DIAGNOSIS — Z13.6 SCREENING FOR ISCHEMIC HEART DISEASE: ICD-10-CM

## 2022-02-21 LAB
ALBUMIN SERPL-MCNC: 3.3 G/DL (ref 3.4–5)
ALP SERPL-CCNC: 75 U/L (ref 40–150)
ALT SERPL W P-5'-P-CCNC: 26 U/L (ref 0–50)
ANION GAP SERPL CALCULATED.3IONS-SCNC: 7 MMOL/L (ref 3–14)
AST SERPL W P-5'-P-CCNC: 17 U/L (ref 0–45)
BILIRUB SERPL-MCNC: 0.3 MG/DL (ref 0.2–1.3)
BUN SERPL-MCNC: 12 MG/DL (ref 7–30)
CALCIUM SERPL-MCNC: 8.8 MG/DL (ref 8.5–10.1)
CHLORIDE BLD-SCNC: 109 MMOL/L (ref 94–109)
CHOLEST SERPL-MCNC: 242 MG/DL
CO2 SERPL-SCNC: 25 MMOL/L (ref 20–32)
CREAT SERPL-MCNC: 0.69 MG/DL (ref 0.52–1.04)
FASTING STATUS PATIENT QL REPORTED: YES
GFR SERPL CREATININE-BSD FRML MDRD: >90 ML/MIN/1.73M2
GLUCOSE BLD-MCNC: 105 MG/DL (ref 70–99)
HDLC SERPL-MCNC: 40 MG/DL
LDLC SERPL CALC-MCNC: 133 MG/DL
NONHDLC SERPL-MCNC: 202 MG/DL
POTASSIUM BLD-SCNC: 4.1 MMOL/L (ref 3.4–5.3)
PROT SERPL-MCNC: 6.5 G/DL (ref 6.8–8.8)
SODIUM SERPL-SCNC: 141 MMOL/L (ref 133–144)
TRIGL SERPL-MCNC: 347 MG/DL

## 2022-02-21 PROCEDURE — 80053 COMPREHEN METABOLIC PANEL: CPT

## 2022-02-21 PROCEDURE — 36415 COLL VENOUS BLD VENIPUNCTURE: CPT

## 2022-02-21 PROCEDURE — 80061 LIPID PANEL: CPT

## 2022-02-21 NOTE — TELEPHONE ENCOUNTER
A (Catheter 6fr Il3.5 Crv 100cm Heartrail 3 Radopq Lg) guide catheter was introduced.    Talked with patient she feels like she has a sinus infection, no fever, has headache and face around sinus is very tender.  She denies any cough or other symptoms.  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  Will send over a z pack, try decongestant.  If not better will need to be seen. HONEY RosalesC

## 2022-02-23 ENCOUNTER — TELEPHONE (OUTPATIENT)
Dept: OBGYN | Facility: CLINIC | Age: 57
End: 2022-02-23
Payer: COMMERCIAL

## 2022-02-23 NOTE — RESULT ENCOUNTER NOTE
Aracelis      Your blood work results are elevated. Could you please call me at your convenience to further discuss these results.  If you have a primary care physician you should consult with them regarding possible medication for elevated lipids and elevated glucose.  If further questions please give me a call.    Valeria Gamble RNC

## 2022-02-23 NOTE — TELEPHONE ENCOUNTER
Talked with patient regarding lab results.  Referred patient to a cardiologist for further follow up on lipids.  She eats a very healthy diet, possible FH of heart disease.  HONEY RosalesC

## 2022-02-27 DIAGNOSIS — F41.9 ANXIETY: ICD-10-CM

## 2022-02-27 DIAGNOSIS — I10 ESSENTIAL HYPERTENSION, BENIGN: Chronic | ICD-10-CM

## 2022-02-28 RX ORDER — LOSARTAN POTASSIUM 50 MG/1
TABLET ORAL
Qty: 90 TABLET | Refills: 3 | Status: SHIPPED | OUTPATIENT
Start: 2022-02-28 | End: 2023-02-14

## 2022-02-28 RX ORDER — CITALOPRAM HYDROBROMIDE 20 MG/1
TABLET ORAL
Qty: 90 TABLET | Refills: 3 | Status: SHIPPED | OUTPATIENT
Start: 2022-02-28 | End: 2023-02-14

## 2022-02-28 NOTE — TELEPHONE ENCOUNTER
"Requested Prescriptions   Pending Prescriptions Disp Refills     losartan (COZAAR) 50 MG tablet [Pharmacy Med Name: Losartan Potassium Oral Tablet 50 MG] 30 tablet 0     Sig: TAKE ONE TABLET BY MOUTH ONCE DAILY       Angiotensin-II Receptors Passed - 2/27/2022  6:16 PM        Passed - Last blood pressure under 140/90 in past 12 months     BP Readings from Last 3 Encounters:   01/17/22 112/62   11/30/20 118/72   09/22/20 125/87                 Passed - Recent (12 mo) or future (30 days) visit within the authorizing provider's specialty     Patient has had an office visit with the authorizing provider or a provider within the authorizing providers department within the previous 12 mos or has a future within next 30 days. See \"Patient Info\" tab in inbasket, or \"Choose Columns\" in Meds & Orders section of the refill encounter.              Passed - Medication is active on med list        Passed - Patient is age 18 or older        Passed - No active pregnancy on record        Passed - Normal serum creatinine on file in past 12 months     Recent Labs   Lab Test 02/21/22  0907   CR 0.69       Ok to refill medication if creatinine is low          Passed - Normal serum potassium on file in past 12 months     Recent Labs   Lab Test 02/21/22  0907   POTASSIUM 4.1                    Passed - No positive pregnancy test in past 12 months           citalopram (CELEXA) 20 MG tablet [Pharmacy Med Name: Citalopram Hydrobromide Oral Tablet 20 MG] 90 tablet 0     Sig: TAKE ONE TABLET BY MOUTH ONCE DAILY       SSRIs Protocol Passed - 2/27/2022  6:16 PM        Passed - Recent (12 mo) or future (30 days) visit within the authorizing provider's specialty     Patient has had an office visit with the authorizing provider or a provider within the authorizing providers department within the previous 12 mos or has a future within next 30 days. See \"Patient Info\" tab in inbasket, or \"Choose Columns\" in Meds & Orders section of the refill " encounter.              Passed - Medication is active on med list        Passed - Patient is age 18 or older        Passed - No active pregnancy on record        Passed - No positive pregnancy test in last 12 months

## 2022-03-03 ENCOUNTER — OFFICE VISIT (OUTPATIENT)
Dept: CARDIOLOGY | Facility: CLINIC | Age: 57
End: 2022-03-03
Payer: COMMERCIAL

## 2022-03-03 ENCOUNTER — CARE COORDINATION (OUTPATIENT)
Dept: CARDIOLOGY | Facility: CLINIC | Age: 57
End: 2022-03-03

## 2022-03-03 ENCOUNTER — HOSPITAL ENCOUNTER (OUTPATIENT)
Dept: CARDIOLOGY | Facility: CLINIC | Age: 57
Discharge: HOME | End: 2022-03-03
Attending: INTERNAL MEDICINE | Admitting: INTERNAL MEDICINE

## 2022-03-03 VITALS
WEIGHT: 129.6 LBS | HEIGHT: 63 IN | HEART RATE: 70 BPM | SYSTOLIC BLOOD PRESSURE: 126 MMHG | BODY MASS INDEX: 22.96 KG/M2 | DIASTOLIC BLOOD PRESSURE: 78 MMHG

## 2022-03-03 DIAGNOSIS — E78.2 MIXED HYPERLIPIDEMIA: Primary | ICD-10-CM

## 2022-03-03 DIAGNOSIS — E78.2 MIXED HYPERLIPIDEMIA: ICD-10-CM

## 2022-03-03 DIAGNOSIS — I10 BENIGN ESSENTIAL HYPERTENSION: ICD-10-CM

## 2022-03-03 PROCEDURE — 75571 CT HRT W/O DYE W/CA TEST: CPT | Mod: GA

## 2022-03-03 PROCEDURE — 75571 CT HRT W/O DYE W/CA TEST: CPT | Mod: 26 | Performed by: INTERNAL MEDICINE

## 2022-03-03 PROCEDURE — 93000 ELECTROCARDIOGRAM COMPLETE: CPT | Performed by: INTERNAL MEDICINE

## 2022-03-03 PROCEDURE — 99203 OFFICE O/P NEW LOW 30 MIN: CPT | Performed by: INTERNAL MEDICINE

## 2022-03-03 NOTE — PROGRESS NOTES
Called to Aracelis, had to [facility].  Informed her that she did have some disease showing on the CT calcium test.  Also said that I am aware she saw Dr. Silveira today, and they discussed lifestyle modifications, what some of the medications are that are available etc.   Writer informed her that she can feel comfortable coming in (or virtual) to see an GUSTABO if she wants further discussion about how she can participate in mitigation,  or to discuss her thoughts and questions.    Dr. Silveira has now reviewed the test results, states that she is low risk, and she should work at her lifestyle modifications, return in 6 months for labs.    Writer has entered these orders.  Awilda Cooper RN on 3/3/2022 at 4:59 PM
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
## 2022-03-29 ENCOUNTER — MYC MEDICAL ADVICE (OUTPATIENT)
Dept: CARDIOLOGY | Facility: CLINIC | Age: 57
End: 2022-03-29
Payer: COMMERCIAL

## 2022-07-18 ENCOUNTER — MEDICAL CORRESPONDENCE (OUTPATIENT)
Dept: LAB | Facility: CLINIC | Age: 57
End: 2022-07-18

## 2022-07-19 ENCOUNTER — LAB (OUTPATIENT)
Dept: LAB | Facility: CLINIC | Age: 57
End: 2022-07-19
Payer: COMMERCIAL

## 2022-07-19 DIAGNOSIS — L40.0 PSORIASIS VULGARIS: Primary | ICD-10-CM

## 2022-07-19 LAB
BASOPHILS # BLD AUTO: 0 10E3/UL (ref 0–0.2)
BASOPHILS NFR BLD AUTO: 0 %
EOSINOPHIL # BLD AUTO: 0 10E3/UL (ref 0–0.7)
EOSINOPHIL NFR BLD AUTO: 1 %
ERYTHROCYTE [DISTWIDTH] IN BLOOD BY AUTOMATED COUNT: 12.8 % (ref 10–15)
HCT VFR BLD AUTO: 42.1 % (ref 35–47)
HGB BLD-MCNC: 14.1 G/DL (ref 11.7–15.7)
IMM GRANULOCYTES # BLD: 0 10E3/UL
IMM GRANULOCYTES NFR BLD: 0 %
LYMPHOCYTES # BLD AUTO: 1.4 10E3/UL (ref 0.8–5.3)
LYMPHOCYTES NFR BLD AUTO: 30 %
MCH RBC QN AUTO: 29.5 PG (ref 26.5–33)
MCHC RBC AUTO-ENTMCNC: 33.5 G/DL (ref 31.5–36.5)
MCV RBC AUTO: 88 FL (ref 78–100)
MONOCYTES # BLD AUTO: 0.4 10E3/UL (ref 0–1.3)
MONOCYTES NFR BLD AUTO: 9 %
NEUTROPHILS # BLD AUTO: 2.8 10E3/UL (ref 1.6–8.3)
NEUTROPHILS NFR BLD AUTO: 60 %
PLATELET # BLD AUTO: 174 10E3/UL (ref 150–450)
RBC # BLD AUTO: 4.78 10E6/UL (ref 3.8–5.2)
WBC # BLD AUTO: 4.7 10E3/UL (ref 4–11)

## 2022-07-19 PROCEDURE — 85025 COMPLETE CBC W/AUTO DIFF WBC: CPT

## 2022-07-19 PROCEDURE — 80076 HEPATIC FUNCTION PANEL: CPT

## 2022-07-19 PROCEDURE — 82465 ASSAY BLD/SERUM CHOLESTEROL: CPT

## 2022-07-19 PROCEDURE — 84478 ASSAY OF TRIGLYCERIDES: CPT

## 2022-07-19 PROCEDURE — 36415 COLL VENOUS BLD VENIPUNCTURE: CPT

## 2022-07-20 LAB
ALBUMIN SERPL-MCNC: 3.9 G/DL (ref 3.4–5)
ALP SERPL-CCNC: 74 U/L (ref 40–150)
ALT SERPL W P-5'-P-CCNC: 32 U/L (ref 0–50)
AST SERPL W P-5'-P-CCNC: 21 U/L (ref 0–45)
BILIRUB DIRECT SERPL-MCNC: 0.1 MG/DL (ref 0–0.2)
BILIRUB SERPL-MCNC: 0.4 MG/DL (ref 0.2–1.3)
CHOLEST SERPL-MCNC: 230 MG/DL
FASTING STATUS PATIENT QL REPORTED: NO
PROT SERPL-MCNC: 6.9 G/DL (ref 6.8–8.8)
TRIGL SERPL-MCNC: 308 MG/DL

## 2022-10-22 ENCOUNTER — HEALTH MAINTENANCE LETTER (OUTPATIENT)
Age: 57
End: 2022-10-22

## 2022-12-07 ENCOUNTER — OFFICE VISIT (OUTPATIENT)
Dept: FAMILY MEDICINE | Facility: CLINIC | Age: 57
End: 2022-12-07
Payer: COMMERCIAL

## 2022-12-07 VITALS
HEIGHT: 64 IN | SYSTOLIC BLOOD PRESSURE: 150 MMHG | WEIGHT: 134 LBS | OXYGEN SATURATION: 98 % | BODY MASS INDEX: 22.88 KG/M2 | DIASTOLIC BLOOD PRESSURE: 95 MMHG | HEART RATE: 64 BPM | TEMPERATURE: 98.4 F

## 2022-12-07 DIAGNOSIS — H11.33 SUBCONJUNCTIVAL HEMORRHAGE OF BOTH EYES: Primary | ICD-10-CM

## 2022-12-07 DIAGNOSIS — B30.9 VIRAL CONJUNCTIVITIS OF BOTH EYES: ICD-10-CM

## 2022-12-07 NOTE — PATIENT INSTRUCTIONS
[facility]  [address] [place] [address], [address], [place] [address]  8:00 AM Appointment

## 2022-12-07 NOTE — NURSING NOTE
"ROOM:1  SOFIA FERRELL    Preferred Name: Aracelis BASURTO AGREED:               DECLINED:          Flu    57 year old  Chief Complaint   Patient presents with     Eye Problem     2022 woke up with a broken blood vessel left eye   Yesterday another blood vessel popped left eye    Both eyes red, and feels like pressure in eye area          Blood pressure (!) 150/95, pulse 64, temperature 98.4  F (36.9  C), temperature source Oral, height 1.626 m (5' 4\"), weight 60.8 kg (134 lb), last menstrual period 2015, SpO2 98 %, not currently breastfeeding. Body mass index is 23 kg/m .  BP completed using cuff size:        Patient Active Problem List   Diagnosis     Benign essential hypertension; goal < 140/90     Anxiety     Mixed hyperlipidemia     Abdominal bloating     Thrombocytopenia (H)     Bell's palsy       Wt Readings from Last 2 Encounters:   22 60.8 kg (134 lb)   22 58.8 kg (129 lb 9.6 oz)     BP Readings from Last 3 Encounters:   22 (!) 150/95   22 126/78   22 112/62       Allergies   Allergen Reactions     Codeine Anxiety       Current Outpatient Medications   Medication     apremilast (OTEZLA) 30 MG tablet     citalopram (CELEXA) 20 MG tablet     Cyanocobalamin (VITAMIN B 12 PO)     losartan (COZAAR) 50 MG tablet     multivitamin w/minerals (THERA-VIT-M) tablet     psyllium 28.3 % POWD     VITAMIN D, CHOLECALCIFEROL, PO     augmented betamethasone dipropionate (DIPROLENE) 0.05 % external lotion     clobetasol (TEMOVATE) 0.05 % external ointment     No current facility-administered medications for this visit.       Social History     Tobacco Use     Smoking status: Former     Packs/day: 0.50     Years: 20.00     Pack years: 10.00     Types: Cigarettes     Quit date: 2013     Years since quittin.3     Smokeless tobacco: Never   Substance Use Topics     Alcohol use: Yes     Alcohol/week: 6.0 standard drinks     Types: 6 Standard drinks or equivalent per week     " Comment: 2 drinks day, wine     Drug use: No       Honoring Choices - Health Care Directive Guide offered to patient at time of visit.    Health Maintenance Due   Topic Date Due     ADVANCE CARE PLANNING  Never done     HEPATITIS B IMMUNIZATION (1 of 3 - 3-dose series) Never done     ZOSTER IMMUNIZATION (1 of 2) Never done     LUNG CANCER SCREENING  Never done     COLORECTAL CANCER SCREENING  03/27/2022       Immunization History   Administered Date(s) Administered     COVID-19 Vaccine 12+ (Pfizer) 02/28/2021, 03/18/2021, 11/17/2021     COVID-19 Vaccine Bivalent Booster 12+ (Pfizer) 11/08/2022     Flu, Unspecified 10/20/2015, 10/04/2017, 10/03/2018, 10/09/2019     Influenza Vaccine >6 months (Alfuria,Fluzone) 09/20/2016, 10/05/2017, 10/03/2018, 10/09/2019, 09/22/2020     MMR 01/01/1999, 08/04/1999     TD (ADULT, 7+) 08/20/2015     Tdap (Adacel,Boostrix) 01/18/2006       Lab Results   Component Value Date    PAP NIL 11/30/2020       Recent Labs   Lab Test 07/19/22  1528 02/21/22  0907 09/30/20  0834 09/22/20  1600 11/21/18  1332 12/27/17  0817 08/08/17  1350   A1C  --   --  5.2  --   --   --   --    LDL  --  133* 103* Cannot estimate LDL when triglyceride exceeds 400 mg/dL  115* 144*   < >  --    HDL  --  40* 44* 52 43*   < >  --    TRIG 308* 347* 259* 607* 86   < >  --    ALT 32 26  --   --  36   < >  --    CR  --  0.69  --  0.73 0.63   < >  --    GFRESTIMATED  --  >90  --  >90 >90   < >  --    GFRESTBLACK  --   --   --  >90 >90   < >  --    ALBUMIN 3.9 3.3*  --   --  3.9   < >  --    POTASSIUM  --  4.1  --  4.1 4.1   < >  --    TSH  --   --   --  1.42  --   --  1.32    < > = values in this interval not displayed.       PHQ-2 ( 1999 Pfizer) 1/14/2022 11/30/2020   Q1: Little interest or pleasure in doing things 0 0   Q2: Feeling down, depressed or hopeless 0 0   PHQ-2 Score 0 0   PHQ-2 Total Score (12-17 Years)- Positive if 3 or more points; Administer PHQ-A if positive - 0   Q1: Little interest or pleasure in doing  things Not at all -   Q2: Feeling down, depressed or hopeless Not at all -   PHQ-2 Score 0 -       PHQ-9 SCORE 9/20/2016 11/27/2019 11/30/2020 1/17/2022   PHQ-9 Total Score - - - -   PHQ-9 Total Score 1 1 0 2       TASHIA-7 SCORE 11/27/2019 11/30/2020 1/17/2022   Total Score 4 0 2       No flowsheet data found.    Abiodun Santamaria    December 7, 2022 4:03 PM

## 2022-12-08 ASSESSMENT — VISUAL ACUITY: OU: 1

## 2022-12-08 NOTE — PROGRESS NOTES
Assessment & Plan   Aracelis was seen today for eye problem.    Diagnoses and all orders for this visit:    Subconjunctival hemorrhage of both eyes  Viral conjunctivitis of both eyes  -No red flag symptoms of ocular pain or pressure. No visual changes. Likely a viral conjunctivitis. Recommended she discontinue visine use. Use warm compresses and f/u with ophthalmology for an eye exam. She is scheduled to see ophthalmology tomorrow AM.     Scaling on geetha-nasal region  -Discussed it is likely perioral dermatitis. Possibly plaque psoriasis, but does not appear like the typical lesions. Recommended she discontinue steroid cream until she sees dermatology. Use a light moisturizer PRN.       Linda Coats, NP   ______________________________________    Subjective   Aracelis is a 57 year old patient here today for Eye Problem. She woke up on 11/30/22 with a broken blood vessel in her left eye. Then over the last week, both of her eyes have gotten progressively more red. Yesterday she had another broken blood vessel in her left eye. She thinks her upper eyelids are more swollen than usual. She has tried visine 2-3x over the last week and lumify a couple times- relief the redness for a few hours. No visual changes. No discharge or crusting from her eyes. No headaches. No recent illnesses/URIs. Really concerned with the redness in her eyes. Worried something could be seriously wrong that would affect her eyesight permanently. She works as an .     Had this rash on her face for the last 2 weeks. Started out as red bumps around the right side of her nose. Then developed a spot on her forehead. Has a history of psoriasis, but never had these lesions on her face. Using an OTC steroid cream for the last week- not helping. Has a dermatology appt on Monday.    Do you need any refills on your Medications today? No    Medical, surgical, family and social histories reviewed and updated as indicated.   Medications and allergies  "reviewed and updated as indicated.       ROS  Review Of Systems  See HPI  General Physical Exam:  Vitals: BP (!) 150/95   Pulse 64   Temp 98.4  F (36.9  C) (Oral)   Ht 1.626 m (5' 4\")   Wt 60.8 kg (134 lb)   LMP 08/06/2015   SpO2 98%   BMI 23.00 kg/m    Physical Exam  Vitals and nursing note reviewed.   Constitutional:       General: She is not in acute distress.     Appearance: Normal appearance. She is not ill-appearing.   HENT:      Head: Normocephalic and atraumatic.   Eyes:      General: Lids are normal. Vision grossly intact. Gaze aligned appropriately. Scleral icterus present. No allergic shiner or visual field deficit.        Right eye: No foreign body, discharge or hordeolum.         Left eye: No foreign body, discharge or hordeolum.      Extraocular Movements: Extraocular movements intact.      Right eye: Normal extraocular motion and no nystagmus.      Left eye: Normal extraocular motion and no nystagmus.      Conjunctiva/sclera:      Right eye: Right conjunctiva is injected. No exudate.     Left eye: Left conjunctiva is injected. No exudate.     Pupils: Pupils are equal, round, and reactive to light.   Pulmonary:      Effort: Pulmonary effort is normal.   Skin:     Comments: Right geetha-nasal erythematous papules with moderate scale. No honey colored crusts. No vesicular lesions.     Left upper forehead: Non-erythematous patch of dry skin.     Neurological:      General: No focal deficit present.      Mental Status: She is alert and oriented to person, place, and time.      Gait: Gait is intact.   Psychiatric:         Mood and Affect: Mood normal.         Behavior: Behavior normal.         Thought Content: Thought content normal.         Judgment: Judgment normal.       "

## 2023-02-14 DIAGNOSIS — F41.9 ANXIETY: ICD-10-CM

## 2023-02-14 DIAGNOSIS — I10 ESSENTIAL HYPERTENSION, BENIGN: Chronic | ICD-10-CM

## 2023-02-14 RX ORDER — CITALOPRAM HYDROBROMIDE 20 MG/1
TABLET ORAL
Qty: 30 TABLET | Refills: 0 | Status: SHIPPED | OUTPATIENT
Start: 2023-02-14 | End: 2023-03-14

## 2023-02-14 RX ORDER — LOSARTAN POTASSIUM 50 MG/1
TABLET ORAL
Qty: 30 TABLET | Refills: 0 | Status: SHIPPED | OUTPATIENT
Start: 2023-02-14 | End: 2023-03-14 | Stop reason: SINTOL

## 2023-02-14 NOTE — TELEPHONE ENCOUNTER
"Requested Prescriptions   Pending Prescriptions Disp Refills     losartan (COZAAR) 50 MG tablet [Pharmacy Med Name: Losartan Potassium Oral Tablet 50 MG] 90 tablet 0     Sig: TAKE ONE TABLET BY MOUTH ONCE DAILY       Angiotensin-II Receptors Failed - 2/14/2023 12:57 PM        Failed - Last blood pressure under 140/90 in past 12 months     BP Readings from Last 3 Encounters:   12/07/22 (!) 150/95   03/03/22 126/78   01/17/22 112/62                 Failed - Recent (12 mo) or future (30 days) visit within the authorizing provider's specialty     Patient has had an office visit with the authorizing provider or a provider within the authorizing providers department within the previous 12 mos or has a future within next 30 days. See \"Patient Info\" tab in inbasket, or \"Choose Columns\" in Meds & Orders section of the refill encounter.              Passed - Medication is active on med list        Passed - Patient is age 18 or older        Passed - No active pregnancy on record        Passed - Normal serum creatinine on file in past 12 months     Recent Labs   Lab Test 02/21/22  0907   CR 0.69       Ok to refill medication if creatinine is low          Passed - Normal serum potassium on file in past 12 months     Recent Labs   Lab Test 02/21/22  0907   POTASSIUM 4.1                    Passed - No positive pregnancy test in past 12 months           citalopram (CELEXA) 20 MG tablet [Pharmacy Med Name: Citalopram Hydrobromide Oral Tablet 20 MG] 90 tablet 0     Sig: TAKE ONE TABLET BY MOUTH ONCE DAILY       SSRIs Protocol Failed - 2/14/2023 12:57 PM        Failed - Recent (12 mo) or future (30 days) visit within the authorizing provider's specialty     Patient has had an office visit with the authorizing provider or a provider within the authorizing providers department within the previous 12 mos or has a future within next 30 days. See \"Patient Info\" tab in inbasket, or \"Choose Columns\" in Meds & Orders section of the refill " encounter.              Passed - Medication is active on med list        Passed - Patient is age 18 or older        Passed - No active pregnancy on record        Passed - No positive pregnancy test in last 12 months         losartan (COZAAR) 50 MG tablet AND citalopram (CELEXA) 20 MG tablet  Last Written Prescription Date:  2/28/22  Last Fill Quantity: 90,  # refills: 3   Last office visit: 1/17/2022 with prescribing provider:  ELENA Gamble NP   Future Office Visit:      Medication is being filled for 1 time refill only due to:  Patient needs to be seen because it has been more than one year since last visit.  Tabitha Cordon RN on 2/14/2023 at 1:04 PM

## 2023-03-13 DIAGNOSIS — F41.9 ANXIETY: ICD-10-CM

## 2023-03-13 RX ORDER — CITALOPRAM HYDROBROMIDE 20 MG/1
TABLET ORAL
Qty: 30 TABLET | Refills: 0 | OUTPATIENT
Start: 2023-03-13

## 2023-03-13 NOTE — TELEPHONE ENCOUNTER
"Requested Prescriptions   Pending Prescriptions Disp Refills     citalopram (CELEXA) 20 MG tablet [Pharmacy Med Name: Citalopram Hydrobromide Oral Tablet 20 MG] 30 tablet 0     Sig: TAKE ONE TABLET BY MOUTH ONCE DAILY       SSRIs Protocol Failed - 3/13/2023  1:37 PM        Failed - Recent (12 mo) or future (30 days) visit within the authorizing provider's specialty     Patient has had an office visit with the authorizing provider or a provider within the authorizing providers department within the previous 12 mos or has a future within next 30 days. See \"Patient Info\" tab in inbasket, or \"Choose Columns\" in Meds & Orders section of the refill encounter.              Passed - Medication is active on med list        Passed - Patient is age 18 or older        Passed - No active pregnancy on record        Passed - No positive pregnancy test in last 12 months           Refill denied  Appointment needed for further refills  Adali Milian RN on 3/13/2023 at 1:41 PM    "

## 2023-03-14 ENCOUNTER — OFFICE VISIT (OUTPATIENT)
Dept: FAMILY MEDICINE | Facility: CLINIC | Age: 58
End: 2023-03-14
Payer: COMMERCIAL

## 2023-03-14 VITALS
TEMPERATURE: 98.2 F | WEIGHT: 128.5 LBS | HEIGHT: 62 IN | RESPIRATION RATE: 17 BRPM | DIASTOLIC BLOOD PRESSURE: 88 MMHG | SYSTOLIC BLOOD PRESSURE: 146 MMHG | BODY MASS INDEX: 23.65 KG/M2 | HEART RATE: 67 BPM | OXYGEN SATURATION: 96 %

## 2023-03-14 DIAGNOSIS — Z00.00 ROUTINE HISTORY AND PHYSICAL EXAMINATION OF ADULT: Primary | ICD-10-CM

## 2023-03-14 DIAGNOSIS — I10 BENIGN ESSENTIAL HYPERTENSION: ICD-10-CM

## 2023-03-14 DIAGNOSIS — R22.0 FACIAL SWELLING: ICD-10-CM

## 2023-03-14 DIAGNOSIS — F41.9 ANXIETY: ICD-10-CM

## 2023-03-14 LAB
ALBUMIN SERPL BCG-MCNC: 4.2 G/DL (ref 3.5–5.2)
ALP SERPL-CCNC: 76 U/L (ref 35–104)
ALT SERPL W P-5'-P-CCNC: 22 U/L (ref 10–35)
ANION GAP SERPL CALCULATED.3IONS-SCNC: 10 MMOL/L (ref 7–15)
AST SERPL W P-5'-P-CCNC: 26 U/L (ref 10–35)
BASOPHILS # BLD AUTO: 0 10E3/UL (ref 0–0.2)
BASOPHILS NFR BLD AUTO: 0 %
BILIRUB SERPL-MCNC: 0.4 MG/DL
BUN SERPL-MCNC: 14.8 MG/DL (ref 6–20)
CALCIUM SERPL-MCNC: 8.9 MG/DL (ref 8.6–10)
CHLORIDE SERPL-SCNC: 104 MMOL/L (ref 98–107)
CREAT SERPL-MCNC: 0.59 MG/DL (ref 0.51–0.95)
DEPRECATED HCO3 PLAS-SCNC: 23 MMOL/L (ref 22–29)
EOSINOPHIL # BLD AUTO: 0.1 10E3/UL (ref 0–0.7)
EOSINOPHIL NFR BLD AUTO: 1 %
ERYTHROCYTE [DISTWIDTH] IN BLOOD BY AUTOMATED COUNT: 13 % (ref 10–15)
GFR SERPL CREATININE-BSD FRML MDRD: >90 ML/MIN/1.73M2
GLUCOSE SERPL-MCNC: 98 MG/DL (ref 70–99)
HBA1C MFR BLD: 5.7 %
HCT VFR BLD AUTO: 44.3 % (ref 35–47)
HGB BLD-MCNC: 14.7 G/DL (ref 11.7–15.7)
IMM GRANULOCYTES # BLD: 0 10E3/UL
IMM GRANULOCYTES NFR BLD: 0 %
LYMPHOCYTES # BLD AUTO: 1.4 10E3/UL (ref 0.8–5.3)
LYMPHOCYTES NFR BLD AUTO: 27 %
MCH RBC QN AUTO: 29.1 PG (ref 26.5–33)
MCHC RBC AUTO-ENTMCNC: 33.2 G/DL (ref 31.5–36.5)
MCV RBC AUTO: 88 FL (ref 78–100)
MONOCYTES # BLD AUTO: 0.4 10E3/UL (ref 0–1.3)
MONOCYTES NFR BLD AUTO: 8 %
NEUTROPHILS # BLD AUTO: 3.3 10E3/UL (ref 1.6–8.3)
NEUTROPHILS NFR BLD AUTO: 64 %
NRBC # BLD AUTO: 0 10E3/UL
NRBC BLD AUTO-RTO: 0 /100
PLATELET # BLD AUTO: 171 10E3/UL (ref 150–450)
POTASSIUM SERPL-SCNC: 3.9 MMOL/L (ref 3.4–5.3)
PROT SERPL-MCNC: 6.8 G/DL (ref 6.4–8.3)
RBC # BLD AUTO: 5.05 10E6/UL (ref 3.8–5.2)
SODIUM SERPL-SCNC: 137 MMOL/L (ref 136–145)
TSH SERPL DL<=0.005 MIU/L-ACNC: 1.59 UIU/ML (ref 0.3–4.2)
WBC # BLD AUTO: 5.3 10E3/UL (ref 4–11)

## 2023-03-14 PROCEDURE — 80053 COMPREHEN METABOLIC PANEL: CPT | Performed by: NURSE PRACTITIONER

## 2023-03-14 PROCEDURE — 84443 ASSAY THYROID STIM HORMONE: CPT | Performed by: NURSE PRACTITIONER

## 2023-03-14 PROCEDURE — 83036 HEMOGLOBIN GLYCOSYLATED A1C: CPT | Performed by: NURSE PRACTITIONER

## 2023-03-14 PROCEDURE — 85025 COMPLETE CBC W/AUTO DIFF WBC: CPT | Performed by: NURSE PRACTITIONER

## 2023-03-14 RX ORDER — AMLODIPINE BESYLATE 5 MG/1
5 TABLET ORAL DAILY
Qty: 90 TABLET | Refills: 1 | Status: SHIPPED | OUTPATIENT
Start: 2023-03-14

## 2023-03-14 RX ORDER — CITALOPRAM HYDROBROMIDE 20 MG/1
20 TABLET ORAL DAILY
Qty: 90 TABLET | Refills: 3 | Status: SHIPPED | OUTPATIENT
Start: 2023-03-14

## 2023-03-14 RX ORDER — CITALOPRAM HYDROBROMIDE 20 MG/1
TABLET ORAL
Qty: 30 TABLET | Refills: 0 | OUTPATIENT
Start: 2023-03-14

## 2023-03-14 ASSESSMENT — ENCOUNTER SYMPTOMS
HEARTBURN: 1
PALPITATIONS: 0
FREQUENCY: 1
EYE PAIN: 0
DIARRHEA: 0
MYALGIAS: 0
FEVER: 0
NERVOUS/ANXIOUS: 0
SORE THROAT: 0
ARTHRALGIAS: 0
DYSURIA: 0
CHILLS: 0
HEMATURIA: 0
HEMATOCHEZIA: 0
DIZZINESS: 0
COUGH: 0
ABDOMINAL PAIN: 1
HEADACHES: 0
NAUSEA: 0
PARESTHESIAS: 0
WEAKNESS: 0
CONSTIPATION: 0
BREAST MASS: 0
SHORTNESS OF BREATH: 0
JOINT SWELLING: 0

## 2023-03-14 ASSESSMENT — ANXIETY QUESTIONNAIRES
1. FEELING NERVOUS, ANXIOUS, OR ON EDGE: NOT AT ALL
IF YOU CHECKED OFF ANY PROBLEMS ON THIS QUESTIONNAIRE, HOW DIFFICULT HAVE THESE PROBLEMS MADE IT FOR YOU TO DO YOUR WORK, TAKE CARE OF THINGS AT HOME, OR GET ALONG WITH OTHER PEOPLE: NOT DIFFICULT AT ALL
3. WORRYING TOO MUCH ABOUT DIFFERENT THINGS: NOT AT ALL
5. BEING SO RESTLESS THAT IT IS HARD TO SIT STILL: NOT AT ALL
6. BECOMING EASILY ANNOYED OR IRRITABLE: SEVERAL DAYS
2. NOT BEING ABLE TO STOP OR CONTROL WORRYING: NOT AT ALL
GAD7 TOTAL SCORE: 1
7. FEELING AFRAID AS IF SOMETHING AWFUL MIGHT HAPPEN: NOT AT ALL
GAD7 TOTAL SCORE: 1

## 2023-03-14 ASSESSMENT — PATIENT HEALTH QUESTIONNAIRE - PHQ9
5. POOR APPETITE OR OVEREATING: NOT AT ALL
SUM OF ALL RESPONSES TO PHQ QUESTIONS 1-9: 1

## 2023-03-14 NOTE — TELEPHONE ENCOUNTER
"Requested Prescriptions   Pending Prescriptions Disp Refills     citalopram (CELEXA) 20 MG tablet [Pharmacy Med Name: Citalopram Hydrobromide Oral Tablet 20 MG] 30 tablet 0     Sig: TAKE ONE TABLET BY MOUTH ONCE DAILY       SSRIs Protocol Failed - 3/14/2023  3:21 PM        Failed - Recent (12 mo) or future (30 days) visit within the authorizing provider's specialty     Patient has had an office visit with the authorizing provider or a provider within the authorizing providers department within the previous 12 mos or has a future within next 30 days. See \"Patient Info\" tab in inbasket, or \"Choose Columns\" in Meds & Orders section of the refill encounter.              Passed - Medication is active on med list        Passed - Patient is age 18 or older        Passed - No active pregnancy on record        Passed - No positive pregnancy test in last 12 months           Last Written Prescription Date:  3/14/23  Last Fill Quantity: 90  # refills: 3   Last office visit: 1/17/2022 with prescribing provider:  ELENA Gamble NP   Future Office Visit:      Pt due for annual, no appt scheduled. Pt already received one month extension. Rx denied.     PT has new provider that renewed this Rx Refused as duplicate    Tabitha Cordon RN on 3/14/2023 at 3:28 PM        "

## 2023-03-14 NOTE — NURSING NOTE
"ROOM:2  GRACIELA DE LUNA    Preferred Name: Aracelis     How did you hear about us?  Other - Referred by another clinic    58 year old  Chief Complaint   Patient presents with     Allergies     Possible allergic reaction around eyes, started  morning   Swollen and red, hot to the touch      Refill Request     blood work     All the basic physical lab work        Blood pressure (!) 146/88, pulse 67, temperature 98.2  F (36.8  C), temperature source Oral, resp. rate 17, height 1.58 m (5' 2.2\"), weight 58.3 kg (128 lb 8 oz), last menstrual period 2015, SpO2 96 %, not currently breastfeeding. Body mass index is 23.35 kg/m .  BP completed using cuff size:        Patient Active Problem List   Diagnosis     Benign essential hypertension; goal < 140/90     Anxiety     Mixed hyperlipidemia     Abdominal bloating     Thrombocytopenia (H)     Bell's palsy       Wt Readings from Last 2 Encounters:   23 58.3 kg (128 lb 8 oz)   22 60.8 kg (134 lb)     BP Readings from Last 3 Encounters:   23 (!) 146/88   22 (!) 150/95   22 126/78       Allergies   Allergen Reactions     Codeine Anxiety       Current Outpatient Medications   Medication     apremilast (OTEZLA) 30 MG tablet     citalopram (CELEXA) 20 MG tablet     Cyanocobalamin (VITAMIN B 12 PO)     losartan (COZAAR) 50 MG tablet     multivitamin w/minerals (THERA-VIT-M) tablet     psyllium 28.3 % POWD     VITAMIN D, CHOLECALCIFEROL, PO     augmented betamethasone dipropionate (DIPROLENE) 0.05 % external lotion     clobetasol (TEMOVATE) 0.05 % external ointment     No current facility-administered medications for this visit.       Social History     Tobacco Use     Smoking status: Former     Packs/day: 0.50     Years: 20.00     Pack years: 10.00     Types: Cigarettes     Quit date: 2013     Years since quittin.5     Smokeless tobacco: Never   Substance Use Topics     Alcohol use: Yes     Alcohol/week: 6.0 standard drinks     Types: 6 " Standard drinks or equivalent per week     Comment: 2 drinks day, wine     Drug use: No       Honoring Choices - Health Care Directive Guide offered to patient at time of visit.    Health Maintenance Due   Topic Date Due     ADVANCE CARE PLANNING  Never done     HEPATITIS B IMMUNIZATION (1 of 3 - 3-dose series) Never done     ZOSTER IMMUNIZATION (1 of 2) Never done     LUNG CANCER SCREENING  Never done     COLORECTAL CANCER SCREENING  03/27/2022     YEARLY PREVENTIVE VISIT  01/17/2023       Immunization History   Administered Date(s) Administered     COVID-19 Vaccine 12+ (Pfizer) 02/28/2021, 03/18/2021, 11/17/2021     COVID-19 Vaccine Bivalent Booster 12+ (Pfizer) 11/08/2022     Flu, Unspecified 10/20/2015, 10/04/2017, 10/03/2018, 10/09/2019     Influenza Vaccine >6 months (Alfuria,Fluzone) 09/20/2016, 10/05/2017, 10/03/2018, 10/09/2019, 09/22/2020     MMR 01/01/1999, 08/04/1999     TD (ADULT, 7+) 08/20/2015     Tdap (Adacel,Boostrix) 01/18/2006       Lab Results   Component Value Date    PAP NIL 11/30/2020       Recent Labs   Lab Test 07/19/22  1528 02/21/22  0907 09/30/20  0834 09/22/20  1600 11/21/18  1332 12/27/17  0817 08/08/17  1350   A1C  --   --  5.2  --   --   --   --    LDL  --  133* 103* Cannot estimate LDL when triglyceride exceeds 400 mg/dL  115* 144*   < >  --    HDL  --  40* 44* 52 43*   < >  --    TRIG 308* 347* 259* 607* 86   < >  --    ALT 32 26  --   --  36   < >  --    CR  --  0.69  --  0.73 0.63   < >  --    GFRESTIMATED  --  >90  --  >90 >90   < >  --    GFRESTBLACK  --   --   --  >90 >90   < >  --    ALBUMIN 3.9 3.3*  --   --  3.9   < >  --    POTASSIUM  --  4.1  --  4.1 4.1   < >  --    TSH  --   --   --  1.42  --   --  1.32    < > = values in this interval not displayed.       PHQ-2 ( 1999 Pfizer) 3/14/2023 3/8/2023   Q1: Little interest or pleasure in doing things 0 0   Q2: Feeling down, depressed or hopeless 0 0   PHQ-2 Score 0 0   PHQ-2 Total Score (12-17 Years)- Positive if 3 or more  points; Administer PHQ-A if positive - -   Q1: Little interest or pleasure in doing things Not at all Not at all   Q2: Feeling down, depressed or hopeless Not at all Not at all   PHQ-2 Score 0 0       PHQ-9 SCORE 11/27/2019 11/30/2020 1/17/2022 3/14/2023   PHQ-9 Total Score - - - -   PHQ-9 Total Score 1 0 2 1       TASHIA-7 SCORE 11/30/2020 1/17/2022 3/14/2023   Total Score 0 2 1       No flowsheet data found.    Sana Patiño, EMT    March 14, 2023 1:23 PM

## 2023-03-14 NOTE — PROGRESS NOTES
Answers for HPI/ROS submitted by the patient on 3/14/2023  Frequency of exercise:: 1 day/week  Getting at least 3 servings of Calcium per day:: NO  Diet:: Regular (no restrictions)  Taking medications regularly:: Yes  Medication side effects:: None       ANNUAL WELLNESS EXAM     Today's Date: Mar 14, 2023     Patient Aracelis Merritt 1965 presents to the clinic today for a preventative health visit.         SUBJECTIVE     History of Present Illness:    Aracelis is mostly doing well, she has 2 concerns today:    1.)  Facial swelling:  Starting 3 days ago she noticed facial swelling, primarily periorbital.  It is slightly red/pink and somewhat warm to the touch.  She does not have a known allergy, she has not used any new products, she has had this before and has been treated with topical steroids and systemic antibiotics. She denies any lip or throat issues, she tried diphenhydramine, she does not like to use it because it is very sedating for her and it was somewhat helpful.  She does feel that the area around her eyes is somewhat pruritic now.  She does not have any upper respiratory or cough symptoms, just the swelling.      2.)  Aracelis has had pelvic pain off and on since menopause at the age of 50.  It feels like menstrual cramps to her, it comes and goes, it is pain at about a 2.  She has a history of IBS, it is very different than those symptoms for her.  She has had one abnormal Pap smear in her 20's, all have been normal since that time.  She has had pelvic ultrasounds in the past, those have all been normal.  She had had no post menopausal bleeding or dyspareunia.     Aracelis is on losartan, she had to stop taking lisinopril for a chronic cough.        She needs to schedule:  Mammogram, colonoscopy and get her shingles vaccine.        Allergies   Allergen Reactions     Codeine Anxiety        Current Outpatient Medications   Medication Instructions     amLODIPine (NORVASC) 5 mg, Oral, DAILY     apremilast  (OTEZLA) 30 MG tablet No dose, route, or frequency recorded.     citalopram (CELEXA) 20 mg, Oral, DAILY     Cyanocobalamin (VITAMIN B 12 PO) 1,000 mcg, Oral     multivitamin w/minerals (THERA-VIT-M) tablet 1 tablet, Oral, DAILY     psyllium 28.3 % POWD 1 teaspoonful, Oral, 2 TIMES DAILY     VITAMIN D, CHOLECALCIFEROL, PO 1,000 Units, Oral, DAILY       Past Medical History:   Diagnosis Date     Anxiety      Bell palsy      Cervical herniated disc      Hair loss     eye lids      History of migraine     with aura - MRI done      Hypertension      IBS (irritable bowel syndrome)      Menarche 13 years old     Plaque psoriasis         Family History   Problem Relation Age of Onset     Hypertension Mother      Cerebrovascular Disease Mother      Alzheimer Disease Mother      Coronary Artery Disease Father      Chemical Addiction Father      Depression/Anxiety Father      Hypertension Sister      Depression/Anxiety Brother         all four brothers     Cerebrovascular Disease Brother      Osteoporosis Maternal Grandmother      No Known Problems Maternal Grandfather      No Known Problems Paternal Grandmother      No Known Problems Paternal Grandfather      No Known Problems Other      Hypertension Sister         Do you have a first-degree relative with a history of the following:  A. Cancer of the breast or ovaries - No   B. Heart attack, heart pain, or stroke before the age of 55 - No  C. Unexplained death from drowning or car accident - No  D. Osteoporosis or any other significant bone health concerns - No    Social History     Tobacco Use     Smoking status: Former     Packs/day: 0.50     Years: 20.00     Pack years: 10.00     Types: Cigarettes     Quit date: 2013     Years since quittin.5     Smokeless tobacco: Never   Substance Use Topics     Alcohol use: Yes     Alcohol/week: 6.0 standard drinks     Types: 6 Standard drinks or equivalent per week     Comment: 2 drinks day, wine     Drug use: No     "    History   Sexual Activity     Sexual activity: Yes     Partners: Male     Birth control/ protection: Post-menopausal         PHQ 11/30/2020 1/17/2022 3/14/2023   PHQ-9 Total Score 0 2 1   Q9: Thoughts of better off dead/self-harm past 2 weeks Not at all Not at all Not at all        Immunization History   Administered Date(s) Administered     COVID-19 Vaccine 12+ (Pfizer) 02/28/2021, 03/18/2021, 11/17/2021     COVID-19 Vaccine Bivalent Booster 12+ (Pfizer) 11/08/2022     Flu, Unspecified 10/20/2015, 10/04/2017, 10/03/2018, 10/09/2019     Influenza Vaccine >6 months (Alfuria,Fluzone) 09/20/2016, 10/05/2017, 10/03/2018, 10/09/2019, 09/22/2020     MMR 01/01/1999, 08/04/1999     TD (ADULT, 7+) 08/20/2015     Tdap (Adacel,Boostrix) 01/18/2006        Health Maintenance Due   Topic Date Due     ADVANCE CARE PLANNING  Never done     HEPATITIS B IMMUNIZATION (1 of 3 - 3-dose series) Never done     ZOSTER IMMUNIZATION (1 of 2) Never done     LUNG CANCER SCREENING  Never done     COLORECTAL CANCER SCREENING  03/27/2022     YEARLY PREVENTIVE VISIT  01/17/2023      Health Maintenance components reviewed - Seasonal Influenza vaccine status is up to date & Covid-19 vaccine status is up to date.    Diet: in general, a \"healthy\" diet      Exercise: 1 day/week for an average of less than 15 minutes    Menopause    Review of Systems   Constitutional: Negative for chills and fever.   HENT: Positive for congestion and hearing loss. Negative for ear pain and sore throat.    Eyes: Negative for pain and visual disturbance.   Respiratory: Negative for cough and shortness of breath.    Cardiovascular: Negative for chest pain, palpitations and peripheral edema.   Gastrointestinal: Positive for abdominal pain and heartburn. Negative for constipation, diarrhea, hematochezia and nausea.   Breasts:  Negative for tenderness, breast mass and discharge.   Genitourinary: Positive for frequency. Negative for dysuria, genital sores, hematuria, " "pelvic pain, urgency, vaginal bleeding and vaginal discharge.   Musculoskeletal: Negative for arthralgias, joint swelling and myalgias.   Skin: Positive for rash.   Neurological: Negative for dizziness, weakness, headaches and paresthesias.   Psychiatric/Behavioral: Negative for mood changes. The patient is not nervous/anxious.             OBJECTIVE     BP (!) 146/88 (BP Location: Left arm, Patient Position: Sitting, Cuff Size: Adult Regular)   Pulse 67   Temp 98.2  F (36.8  C) (Oral)   Resp 17   Ht 1.58 m (5' 2.2\")   Wt 58.3 kg (128 lb 8 oz)   LMP 08/06/2015   SpO2 96%   BMI 23.35 kg/m         Estimated body mass index is 23.35 kg/m  as calculated from the following:    Height as of this encounter: 1.58 m (5' 2.2\").    Weight as of this encounter: 58.3 kg (128 lb 8 oz).    Complete \"Weight Managment Plan\" in the progress note from the Adult Preventative or Medicare smartsets, use phrase .WEIGHTPLAN, or choose an option from Weight Management Resources smartset below.        Labs:  Lab Results   Component Value Date    WBC 4.7 07/19/2022    HGB 14.1 07/19/2022    HCT 42.1 07/19/2022     07/19/2022    CHOL 230 (H) 07/19/2022    TRIG 308 (H) 07/19/2022    HDL 40 (L) 02/21/2022    ALT 32 07/19/2022    AST 21 07/19/2022     02/21/2022    BUN 12 02/21/2022    CO2 25 02/21/2022    TSH 1.42 09/22/2020       Physical Exam  Constitutional:       Appearance: She is normal weight.   HENT:      Head: Normocephalic and atraumatic.      Right Ear: Tympanic membrane, ear canal and external ear normal.      Left Ear: Tympanic membrane, ear canal and external ear normal.      Nose: Nose normal.      Mouth/Throat:      Mouth: Mucous membranes are moist.      Pharynx: Oropharynx is clear.   Eyes:      Extraocular Movements: Extraocular movements intact.      Conjunctiva/sclera: Conjunctivae normal.      Pupils: Pupils are equal, round, and reactive to light.      Comments: Periorbital swelling, skin red/pink in " "color, more \"baggy\" than firm swelling.  Greater on the left eye.     Cardiovascular:      Rate and Rhythm: Normal rate and regular rhythm.      Pulses: Normal pulses.      Heart sounds: Normal heart sounds.   Pulmonary:      Effort: Pulmonary effort is normal.      Breath sounds: Normal breath sounds.   Chest:   Breasts:     Zak Score is 5.      Right: Normal.      Left: Normal.   Abdominal:      General: Abdomen is flat. Bowel sounds are normal.      Palpations: Abdomen is soft.   Genitourinary:     General: Normal vulva.      Exam position: Lithotomy position.      Zak stage (genital): 5.      Vagina: Normal.      Cervix: Normal.      Uterus: Normal.       Adnexa: Right adnexa normal and left adnexa normal.      Comments: Bimanual exam, normal  Musculoskeletal:         General: Normal range of motion.      Cervical back: Normal range of motion and neck supple.   Skin:     General: Skin is warm and dry.      Capillary Refill: Capillary refill takes less than 2 seconds.   Neurological:      General: No focal deficit present.      Mental Status: She is alert and oriented to person, place, and time.   Psychiatric:         Mood and Affect: Mood normal.         Behavior: Behavior normal.         Thought Content: Thought content normal.         Judgment: Judgment normal.               ASSESSMENT/PLAN     (Z00.00) Routine history and physical examination of adult  (primary encounter diagnosis)    Plan: CBC with platelets differential, TSH with free         T4 reflex, Comprehensive metabolic panel,         Hemoglobin A1c, Hemoglobin A1c    (R22.0) Facial swelling  Comment: I have a concern for angioedema or the facial swelling possibly caused by her losartan.  I will switch her medication and discontinue the losartan  Zyrtec 10 mg twice daily for 7 days    (I10) Benign essential hypertension    Plan: amLODIPine (NORVASC) 5 MG tablet    (F41.9) Anxiety  Comment: refill  Plan: citalopram (CELEXA) 20 MG " tablet    -Discussed/Reinforced healthy diety, lifestyle, exercise and safety.  -Recommended completion of routine dental and eye exam.  -Lab screenings completed today. Results pending.          PAP (if applicable): Complete Date of Completion: 11/30/2020 Follow-up Recommendation: 2025  CBE (if applicable): Complete Date of Completion: today Follow-up Recommendation: one year  Mammogram (if applicable): Aracelis wants to schedule Follow-up Recommendation: as needed  Colon cancer screening (if applicable): Aracelis wants to schedule Follow-up Recommendation: as needed  Dexa Bone Density Screening (if applicable): Incomplete Date of Completion: need to be scheduled Follow-up Recommendation: needs to be scheduled  Cholesterol Screening (if applicable): Complete Date of Completion: today Follow-up Recommendation: as needed  Diabetes Screening (if applicable): Complete Date of Completion: today Follow-up Recommendation: as needed  Thyroid Screening (if applicable): Complete Date of Completion: today Follow-up Recommendation: as needed  Depression Screening (if applicable): Complete Date of Completion: today Follow-up Recommendation: one year    Follow-Up:  Follow up in one year, or sooner if needed.     Patient engaged in their plan of care. Patient verbalized understanding and agreed with the final plan.  AVS printed and given to patient.    Sherley Newman NP   [facility]  [facility]  [address] [place] [id number] [phone]      Bi-annual eye exam:: Yes  Dental care twice a year:: Yes  Sleep apnea or symptoms of sleep apnea:: None  abdominal pain: Yes  Blood in stool: No  Blood in urine: No  chest pain: No  chills: No  congestion: Yes  constipation: No  cough: No  diarrhea: No  dizziness: No  ear pain: No  eye pain: No  nervous/anxious: No  fever: No  frequency: Yes  genital sores: No  headaches: No  hearing loss: Yes  heartburn: Yes  arthralgias: No  joint  swelling: No  peripheral edema: No  mood changes: No  myalgias: No  nausea: No  dysuria: No  palpitations: No  Skin sensation changes: No  sore throat: No  urgency: No  rash: Yes  shortness of breath: No  visual disturbance: No  weakness: No  pelvic pain: No  vaginal bleeding: No  vaginal discharge: No  tenderness: No  breast mass: No  breast discharge: No  Additional concerns today:: Yes  Duration of exercise:: Less than 15 minutes

## 2023-03-24 ENCOUNTER — LAB (OUTPATIENT)
Dept: LAB | Facility: CLINIC | Age: 58
End: 2023-03-24
Payer: COMMERCIAL

## 2023-03-24 DIAGNOSIS — E78.2 MIXED HYPERLIPIDEMIA: ICD-10-CM

## 2023-03-24 PROCEDURE — 80061 LIPID PANEL: CPT | Performed by: NURSE PRACTITIONER

## 2023-03-25 LAB
CHOLEST SERPL-MCNC: 269 MG/DL
HDLC SERPL-MCNC: 55 MG/DL
LDLC SERPL CALC-MCNC: 186 MG/DL
NONHDLC SERPL-MCNC: 214 MG/DL
TRIGL SERPL-MCNC: 140 MG/DL

## 2023-03-27 ENCOUNTER — OFFICE VISIT (OUTPATIENT)
Dept: FAMILY MEDICINE | Facility: CLINIC | Age: 58
End: 2023-03-27
Payer: COMMERCIAL

## 2023-03-27 VITALS
WEIGHT: 132 LBS | BODY MASS INDEX: 22.53 KG/M2 | HEART RATE: 72 BPM | DIASTOLIC BLOOD PRESSURE: 87 MMHG | SYSTOLIC BLOOD PRESSURE: 133 MMHG | HEIGHT: 64 IN | OXYGEN SATURATION: 97 % | TEMPERATURE: 98.4 F

## 2023-03-27 DIAGNOSIS — E78.5 HYPERLIPIDEMIA LDL GOAL <100: Primary | ICD-10-CM

## 2023-03-27 DIAGNOSIS — R73.03 PREDIABETES: ICD-10-CM

## 2023-03-27 NOTE — PROGRESS NOTES
"[facility] ACUTE OR FOLLOW UP APPOINTMENT    Patient: Aracelis Merritt YOB: 1965    Date of Exam: 3/27/23    Arrival Time: {Hour:585092::\"03\"} {Clock minutes:022007} {AM/PM:980934}  Departure Time: {Hour:817777::\"03\"} {Clock minutes:132626} {AM/PM:600160}    Please be advised that this client resides in a facility in which narcotic medications are not permitted. If pain management is needed, please prescribe an alternative medication.     Aracelis Merritt is a 58 year old who presents for the following    No chief complaint on file.      Do you need any refills on your Medications today? {37971 Yes or No:889401}    Review Of Systems  Review Of Systems  Skin: {Skin:100::\"negative\"}  Eyes: {Eyes:200::\"negative\"}  Ears/Nose/Throat: {ENT:300::\"negative\"}  Respiratory: {Resp:400::\"No shortness of breath, dyspnea on exertion, cough, or hemoptysis\"}  Cardiovascular: {CV:500::\"negative\"}  Gastrointestinal: {GI:600::\"negative\"}  Genitourinary: {:700::\"negative\"}  Musculoskeletal: {Musc:800::\"negative\"}  Neurologic: {Neur:900::\"negative\"}  Psychiatric: {Psych:1000::\"negative\"}  Hematologic/Lymphatic/Immunologic: {Hem:1100::\"negative\"}  Endocrine: {Endo:1200::\"negative\"}    General Physical Exam:  Vitals: LMP 08/06/2015   {HI Physical Exam:787601}    If prescribed a controlled substance, may client take medication home when discharged from [facility]? {Y/N:181086}    Additional Comments:{n/a:443383}    Assessment / Plan:  {Assessment/Plan Pick List :563705}    Referrals Made:   {AP Referral to:025424}  If a referral was made to a [facility] and you don't get a call from central scheduling please call [phone].  If a Mammogram was ordered for you at [facility] call [phone] to schedule or change your appointment.  If you had an XRay/CT/Ultrasound/MRI ordered the number is [phone] to schedule or change your radiology appointment.     {follow up:608594}    Medication changes made " "at today's visit: {For med changes:084852::\"MEDICATIONS: \",\"     - Continue other medications without change\"}    Sherley Newman NP March 27, 2023     Arrival Time: {Hour:741800::\"03\"} {Clock minutes:606977} {AM/PM:966892}  Departure Time: {Hour:744502::\"03\"} {Clock minutes:648952} {AM/PM:105248}         "
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
## 2023-03-27 NOTE — NURSING NOTE
"ROOM:2  GRACIELA DE LUNA    Preferred Name: Aracelis     How did you hear about us?  Current Patient    58 year old  Chief Complaint   Patient presents with     Results     Follow up        Blood pressure 133/87, pulse 72, temperature 98.4  F (36.9  C), temperature source Oral, height 1.626 m (5' 4\"), weight 59.9 kg (132 lb), last menstrual period 2015, SpO2 97 %, not currently breastfeeding. Body mass index is 22.66 kg/m .  BP completed using cuff size:        Patient Active Problem List   Diagnosis     Benign essential hypertension; goal < 140/90     Anxiety     Mixed hyperlipidemia     Abdominal bloating     Thrombocytopenia (H)     Bell's palsy       Wt Readings from Last 2 Encounters:   23 59.9 kg (132 lb)   23 58.3 kg (128 lb 8 oz)     BP Readings from Last 3 Encounters:   23 133/87   23 (!) 146/88   22 (!) 150/95       Allergies   Allergen Reactions     Codeine Anxiety       Current Outpatient Medications   Medication     amLODIPine (NORVASC) 5 MG tablet     apremilast (OTEZLA) 30 MG tablet     citalopram (CELEXA) 20 MG tablet     Cyanocobalamin (VITAMIN B 12 PO)     multivitamin w/minerals (THERA-VIT-M) tablet     psyllium 28.3 % POWD     VITAMIN D, CHOLECALCIFEROL, PO     No current facility-administered medications for this visit.       Social History     Tobacco Use     Smoking status: Former     Packs/day: 0.50     Years: 20.00     Pack years: 10.00     Types: Cigarettes     Quit date: 2013     Years since quittin.6     Smokeless tobacco: Never   Substance Use Topics     Alcohol use: Yes     Alcohol/week: 6.0 standard drinks     Types: 6 Standard drinks or equivalent per week     Comment: 2 drinks day, wine     Drug use: No       Honoring Choices - Health Care Directive Guide offered to patient at time of visit.    Health Maintenance Due   Topic Date Due     ADVANCE CARE PLANNING  Never done     HEPATITIS B IMMUNIZATION (1 of 3 - 3-dose series) Never done     " ZOSTER IMMUNIZATION (1 of 2) Never done     LUNG CANCER SCREENING  Never done     COLORECTAL CANCER SCREENING  03/27/2022       Immunization History   Administered Date(s) Administered     COVID-19 Vaccine 12+ (Pfizer) 02/28/2021, 03/18/2021, 11/17/2021     COVID-19 Vaccine Bivalent Booster 12+ (Pfizer) 11/08/2022     Flu, Unspecified 10/20/2015, 10/04/2017, 10/03/2018, 10/09/2019     Influenza Vaccine >6 months (Alfuria,Fluzone) 09/20/2016, 10/05/2017, 10/03/2018, 10/09/2019, 09/22/2020     MMR 01/01/1999, 08/04/1999     TD,PF 7+ (Tenivac) 08/20/2015     TDAP (Adacel,Boostrix) 01/18/2006       Lab Results   Component Value Date    PAP NIL 11/30/2020       Recent Labs   Lab Test 03/24/23  0802 03/14/23  1456 07/19/22  1528 02/21/22  0907 02/21/22  0907 09/30/20  0834 09/22/20  1600 11/21/18  1332   A1C  --  5.7*  --   --   --  5.2  --   --    *  --   --   --  133* 103* Cannot estimate LDL when triglyceride exceeds 400 mg/dL  115* 144*   HDL 55  --   --   --  40* 44* 52 43*   TRIG 140  --  308*  --  347* 259* 607* 86   ALT  --  22 32  --  26  --   --  36   CR  --  0.59  --   --  0.69  --  0.73 0.63   GFRESTIMATED  --  >90  --   --  >90  --  >90 >90   GFRESTBLACK  --   --   --   --   --   --  >90 >90   ALBUMIN  --  4.2 3.9   < > 3.3*  --   --  3.9   POTASSIUM  --  3.9  --   --  4.1  --  4.1 4.1   TSH  --  1.59  --   --   --   --  1.42  --     < > = values in this interval not displayed.       PHQ-2 ( 1999 Pfizer) 3/14/2023 3/8/2023   Q1: Little interest or pleasure in doing things 0 0   Q2: Feeling down, depressed or hopeless 0 0   PHQ-2 Score 0 0   PHQ-2 Total Score (12-17 Years)- Positive if 3 or more points; Administer PHQ-A if positive - -   Q1: Little interest or pleasure in doing things Not at all Not at all   Q2: Feeling down, depressed or hopeless Not at all Not at all   PHQ-2 Score 0 0       PHQ-9 SCORE 11/27/2019 11/30/2020 1/17/2022 3/14/2023   PHQ-9 Total Score - - - -   PHQ-9 Total Score 1 0 2 1        TASHIA-7 SCORE 11/30/2020 1/17/2022 3/14/2023   Total Score 0 2 1       No flowsheet data found.    Abiodun Santamaria    March 27, 2023 2:15 PM

## 2023-03-30 ENCOUNTER — HOSPITAL ENCOUNTER (OUTPATIENT)
Dept: MAMMOGRAPHY | Facility: CLINIC | Age: 58
Discharge: HOME | End: 2023-03-30
Attending: NURSE PRACTITIONER | Admitting: NURSE PRACTITIONER
Payer: COMMERCIAL

## 2023-03-30 DIAGNOSIS — Z12.31 VISIT FOR SCREENING MAMMOGRAM: ICD-10-CM

## 2023-03-30 PROCEDURE — 77067 SCR MAMMO BI INCL CAD: CPT

## 2023-05-15 ENCOUNTER — APPOINTMENT (OUTPATIENT)
Dept: ULTRASOUND IMAGING | Facility: CLINIC | Age: 58
End: 2023-05-15
Attending: EMERGENCY MEDICINE
Payer: COMMERCIAL

## 2023-05-15 ENCOUNTER — HOSPITAL ENCOUNTER (EMERGENCY)
Facility: CLINIC | Age: 58
Discharge: HOME | End: 2023-05-15
Attending: EMERGENCY MEDICINE | Admitting: EMERGENCY MEDICINE
Payer: COMMERCIAL

## 2023-05-15 ENCOUNTER — APPOINTMENT (OUTPATIENT)
Dept: CT IMAGING | Facility: CLINIC | Age: 58
End: 2023-05-15
Attending: EMERGENCY MEDICINE
Payer: COMMERCIAL

## 2023-05-15 VITALS
OXYGEN SATURATION: 98 % | HEART RATE: 75 BPM | DIASTOLIC BLOOD PRESSURE: 95 MMHG | BODY MASS INDEX: 22.32 KG/M2 | TEMPERATURE: 98.3 F | HEIGHT: 63 IN | RESPIRATION RATE: 17 BRPM | SYSTOLIC BLOOD PRESSURE: 140 MMHG | WEIGHT: 126 LBS

## 2023-05-15 DIAGNOSIS — R10.84 ABDOMINAL PAIN, GENERALIZED: ICD-10-CM

## 2023-05-15 LAB
ALBUMIN SERPL BCG-MCNC: 4.2 G/DL (ref 3.5–5.2)
ALBUMIN UR-MCNC: NEGATIVE MG/DL
ALP SERPL-CCNC: 86 U/L (ref 35–104)
ALT SERPL W P-5'-P-CCNC: 19 U/L (ref 10–35)
ANION GAP SERPL CALCULATED.3IONS-SCNC: 10 MMOL/L (ref 7–15)
APPEARANCE UR: CLEAR
AST SERPL W P-5'-P-CCNC: 24 U/L (ref 10–35)
BILIRUB SERPL-MCNC: 0.5 MG/DL
BILIRUB UR QL STRIP: NEGATIVE
BUN SERPL-MCNC: 10.7 MG/DL (ref 6–20)
CALCIUM SERPL-MCNC: 9.3 MG/DL (ref 8.6–10)
CHLORIDE SERPL-SCNC: 103 MMOL/L (ref 98–107)
COLOR UR AUTO: ABNORMAL
CREAT SERPL-MCNC: 0.62 MG/DL (ref 0.51–0.95)
DEPRECATED HCO3 PLAS-SCNC: 28 MMOL/L (ref 22–29)
ERYTHROCYTE [DISTWIDTH] IN BLOOD BY AUTOMATED COUNT: 12.4 % (ref 10–15)
GFR SERPL CREATININE-BSD FRML MDRD: >90 ML/MIN/1.73M2
GLUCOSE SERPL-MCNC: 97 MG/DL (ref 70–99)
GLUCOSE UR STRIP-MCNC: NEGATIVE MG/DL
HCT VFR BLD AUTO: 42.9 % (ref 35–47)
HGB BLD-MCNC: 14.5 G/DL (ref 11.7–15.7)
HGB UR QL STRIP: NEGATIVE
HOLD SPECIMEN: NORMAL
KETONES UR STRIP-MCNC: NEGATIVE MG/DL
LEUKOCYTE ESTERASE UR QL STRIP: NEGATIVE
LIPASE SERPL-CCNC: 41 U/L (ref 13–60)
MCH RBC QN AUTO: 29.5 PG (ref 26.5–33)
MCHC RBC AUTO-ENTMCNC: 33.8 G/DL (ref 31.5–36.5)
MCV RBC AUTO: 87 FL (ref 78–100)
MUCOUS THREADS #/AREA URNS LPF: PRESENT /LPF
NITRATE UR QL: NEGATIVE
PH UR STRIP: 5 [PH] (ref 5–7)
PLATELET # BLD AUTO: 157 10E3/UL (ref 150–450)
POTASSIUM SERPL-SCNC: 3.7 MMOL/L (ref 3.4–5.3)
PROT SERPL-MCNC: 6.9 G/DL (ref 6.4–8.3)
RBC # BLD AUTO: 4.92 10E6/UL (ref 3.8–5.2)
RBC URINE: <1 /HPF
SODIUM SERPL-SCNC: 141 MMOL/L (ref 136–145)
SP GR UR STRIP: 1.02 (ref 1–1.03)
SQUAMOUS EPITHELIAL: 1 /HPF
TROPONIN T SERPL HS-MCNC: 9 NG/L
UROBILINOGEN UR STRIP-MCNC: NORMAL MG/DL
WBC # BLD AUTO: 3.8 10E3/UL (ref 4–11)
WBC URINE: 1 /HPF

## 2023-05-15 PROCEDURE — 81001 URINALYSIS AUTO W/SCOPE: CPT | Performed by: EMERGENCY MEDICINE

## 2023-05-15 PROCEDURE — 96360 HYDRATION IV INFUSION INIT: CPT

## 2023-05-15 PROCEDURE — 258N000003 HC RX IP 258 OP 636: Performed by: EMERGENCY MEDICINE

## 2023-05-15 PROCEDURE — 83690 ASSAY OF LIPASE: CPT | Performed by: EMERGENCY MEDICINE

## 2023-05-15 PROCEDURE — 85027 COMPLETE CBC AUTOMATED: CPT | Performed by: EMERGENCY MEDICINE

## 2023-05-15 PROCEDURE — 76705 ECHO EXAM OF ABDOMEN: CPT

## 2023-05-15 PROCEDURE — 99284 EMERGENCY DEPT VISIT MOD MDM: CPT | Mod: 25

## 2023-05-15 PROCEDURE — 80053 COMPREHEN METABOLIC PANEL: CPT | Performed by: EMERGENCY MEDICINE

## 2023-05-15 PROCEDURE — 36415 COLL VENOUS BLD VENIPUNCTURE: CPT | Performed by: EMERGENCY MEDICINE

## 2023-05-15 PROCEDURE — 74176 CT ABD & PELVIS W/O CONTRAST: CPT

## 2023-05-15 PROCEDURE — 84484 ASSAY OF TROPONIN QUANT: CPT | Performed by: EMERGENCY MEDICINE

## 2023-05-15 RX ADMIN — SODIUM CHLORIDE 1000 ML: 9 INJECTION, SOLUTION INTRAVENOUS at 09:38

## 2023-05-15 ASSESSMENT — ACTIVITIES OF DAILY LIVING (ADL)
ADLS_ACUITY_SCORE: 35
ADLS_ACUITY_SCORE: 35

## 2023-05-15 NOTE — ED PROVIDER NOTES
History     Chief Complaint:  Back Pain and Abdominal Pain       HPI   Aracelis Merritt is a 58 year old female with a history of recent COVID infection (May 2) who presents with 24-hour history of abdominal discomfort.  She describes the pain as a spasm.  Pain starts in her back and moves down through the lumbar spine and into the abdomen.  The pain is so severe that she has to hold onto the dresser to keep standing.  The patient was woken up this morning at approximately 1 AM with increasing pain.  She took 3 ibuprofen and the pain decreased.  She was unable to have a bowel movement.  The patient denies fever vomiting, hematuria, family history of similar symptoms or kidney stone.  She denies previous history of abdominal surgery      Independent Historian:  The patient and   Spouse/Partner - They report Corroborates HPI    Review of External Notes:  3/27/2023[facility] [employer]  Sherley Newman NP (Nurse Practitioner)     Family Medicine  Summary: hyperlipidemia and borderline diabetes   Expand All Collapse All  Aracelis Merritt is a 58 year old female who presents today to discuss her hyperlipidemia and very borderline prediabetes.  She has been discussing this with family and she is very motivated to make changes where needed.  She has reduced the alcohol in her diet as well, she may have had 2-3 glasses of wine/day, she now has one.  She is very interested and motivated to do her health with dietary and activity changes before medication.          ROS:  See the HPI, otherwise the rest of the ROS is negative.    Allergies:  Codeine     Medications:    amLODIPine (NORVASC) 5 MG tablet  apremilast (OTEZLA) 30 MG tablet  citalopram (CELEXA) 20 MG tablet  Cyanocobalamin (VITAMIN B 12 PO)  multivitamin w/minerals (THERA-VIT-M) tablet  psyllium 28.3 % POWD  VITAMIN D, CHOLECALCIFEROL, PO        Past Medical History:    Past Medical History:   Diagnosis Date     Anxiety      Bell palsy      Cervical herniated disc  "     Hair loss      History of migraine      Hypertension      IBS (irritable bowel syndrome)      Menarche 13 years old     Plaque psoriasis        Past Surgical History:    Past Surgical History:   Procedure Laterality Date     NO HISTORY OF SURGERY          Family History:    family history includes Alzheimer Disease in her mother; Cerebrovascular Disease in her brother and mother; Chemical Addiction in her father; Coronary Artery Disease in her father; Depression/Anxiety in her brother and father; Hypertension in her mother, sister, and sister; No Known Problems in her maternal grandfather, paternal grandfather, paternal grandmother, and another family member; Osteoporosis in her maternal grandmother.    Social History:   reports that she quit smoking about 9 years ago. Her smoking use included cigarettes. She has a 10.00 pack-year smoking history. She has never used smokeless tobacco. She reports current alcohol use of about 6.0 standard drinks of alcohol per week. She reports that she does not use drugs.  PCP: Anuradha Choi     Physical Exam     Patient Vitals for the past 24 hrs:   BP Temp Temp src Pulse Resp SpO2 Height Weight   05/15/23 0743 (!) 155/91 98.3  F (36.8  C) Temporal 71 20 98 % 1.6 m (5' 3\") 57.2 kg (126 lb)        Physical Exam  General: Alert, No distress. Nontoxic appearance  Head: No signs of trauma.   Mouth/Throat: Oropharynx moist.   Eyes: Conjunctivae are normal. Pupils are equal..   Neck: Normal range of motion.    CV: Appears well perfused.  Resp:No respiratory distress.   ABD: Soft, mild tenderness in the right upper quadrant, nondistended  MSK: Normal range of motion. No obvious deformity.   Neuro: The patient is alert and interactive. KIM. Speech normal. GCS 15  Skin: No lesion or sign of trauma noted.   Psych: normal mood and affect. behavior is normal.       Emergency Department Course     Imaging:  Abd/pelvis CT no contrast - Stone Protocol   Final Result   IMPRESSION: No " specific acute abnormality identified.      AMADA BERNSTEIN MD            SYSTEM ID:  [id number]      Abdomen US, limited (RUQ only)   Final Result   IMPRESSION:   1.  No gallstones. Negative sonographic Gambino's sign. No biliary   ductal dilatation.   2.  Tiny gallbladder polyp.      AMADA BERNSTEIN MD            SYSTEM ID:  [id number]           Laboratory:  Labs Ordered and Resulted from Time of ED Arrival to Time of ED Departure   CBC WITH PLATELETS - Abnormal       Result Value    WBC Count 3.8 (*)     RBC Count 4.92      Hemoglobin 14.5      Hematocrit 42.9      MCV 87      MCH 29.5      MCHC 33.8      RDW 12.4      Platelet Count 157     ROUTINE UA WITH MICROSCOPIC REFLEX TO CULTURE - Abnormal    Color Urine Light Yellow      Appearance Urine Clear      Glucose Urine Negative      Bilirubin Urine Negative      Ketones Urine Negative      Specific Gravity Urine 1.016      Blood Urine Negative      pH Urine 5.0      Protein Albumin Urine Negative      Urobilinogen Urine Normal      Nitrite Urine Negative      Leukocyte Esterase Urine Negative      Mucus Urine Present (*)     RBC Urine <1      WBC Urine 1      Squamous Epithelials Urine 1     COMPREHENSIVE METABOLIC PANEL - Normal    Sodium 141      Potassium 3.7      Chloride 103      Carbon Dioxide (CO2) 28      Anion Gap 10      Urea Nitrogen 10.7      Creatinine 0.62      Calcium 9.3      Glucose 97      Alkaline Phosphatase 86      AST 24      ALT 19      Protein Total 6.9      Albumin 4.2      Bilirubin Total 0.5      GFR Estimate >90     LIPASE - Normal    Lipase 41     TROPONIN T, HIGH SENSITIVITY - Normal    Troponin T, High Sensitivity 9          Emergency Department Course & Assessments:      Interventions:  Medications   0.9% sodium chloride BOLUS (0 mLs Intravenous Stopped 5/15/23 1045)        Independent Interpretation (X-rays, CTs, rhythm strip):  Ultrasound negative for cholecystitis    Consultations/Discussion of Management or Tests:  None        Social  Determinants of Health affecting care:   Stress/Adjustment Disorders    Disposition:  The patient was discharged to home.     Impression & Plan      Medical Decision Making:  Aracelis Merritt is a 58 year old female who presents with abdominal pain.  She looks overall well and without a concerning etiology of abdominal pain.  A broad differential diagnosis was of course considered. The workup in the ED is at this point negative.  No etiology for the patients pain is found at this point and my suspicion of an intraabdominal catastrophe or other worrisome etiology is very low.   US, CT and lab work are reassuring.    I will not therefore admit her for serial exams and further workup.  Patient is hemodynamically stable in ED. Plan is home with abdominal pain recheck by primary care physician or return to ED at anytime.  Return for fevers greater than 102, increasing pain, other new symptoms develop.  Abdominal pain handout given.  Questions were answered. .    Diagnosis:    ICD-10-CM    1. Abdominal pain, generalized  R10.84                     Jef Houser MD  05/16/23 1042

## 2023-05-15 NOTE — ED TRIAGE NOTES
Abd pain and back pain started yesterday     Triage Assessment     Row Name 05/15/23 0744       Triage Assessment (Adult)    Airway WDL WDL       Respiratory WDL    Respiratory WDL WDL       Cardiac WDL    Cardiac WDL WDL       Cognitive/Neuro/Behavioral WDL    Cognitive/Neuro/Behavioral WDL WDL

## 2023-06-01 ENCOUNTER — TRANSFERRED RECORDS (OUTPATIENT)
Dept: HEALTH INFORMATION MANAGEMENT | Facility: CLINIC | Age: 58
End: 2023-06-01

## 2023-06-09 ENCOUNTER — TRANSFERRED RECORDS (OUTPATIENT)
Dept: HEALTH INFORMATION MANAGEMENT | Facility: CLINIC | Age: 58
End: 2023-06-09

## 2023-06-09 PROCEDURE — 88305 TISSUE EXAM BY PATHOLOGIST: CPT | Performed by: PATHOLOGY

## 2023-06-12 ENCOUNTER — LAB REQUISITION (OUTPATIENT)
Dept: LAB | Facility: CLINIC | Age: 58
End: 2023-06-12

## 2023-06-12 DIAGNOSIS — K57.30 DIVERTICULOSIS OF LARGE INTESTINE WITHOUT PERFORATION OR ABSCESS WITHOUT BLEEDING: ICD-10-CM

## 2023-06-12 DIAGNOSIS — R19.4 CHANGE IN BOWEL HABIT: ICD-10-CM

## 2023-06-12 DIAGNOSIS — R10.84 GENERALIZED ABDOMINAL PAIN: ICD-10-CM

## 2023-06-12 DIAGNOSIS — K59.00 CONSTIPATION, UNSPECIFIED: ICD-10-CM

## 2023-06-12 DIAGNOSIS — D12.8 BENIGN NEOPLASM OF RECTUM: ICD-10-CM

## 2023-06-13 LAB
PATH REPORT.COMMENTS IMP SPEC: NORMAL
PATH REPORT.COMMENTS IMP SPEC: NORMAL
PATH REPORT.FINAL DX SPEC: NORMAL
PATH REPORT.GROSS SPEC: NORMAL
PATH REPORT.MICROSCOPIC SPEC OTHER STN: NORMAL
PATH REPORT.RELEVANT HX SPEC: NORMAL
PHOTO IMAGE: NORMAL

## 2023-06-29 DIAGNOSIS — I10 ESSENTIAL HYPERTENSION, BENIGN: Chronic | ICD-10-CM

## 2023-06-29 RX ORDER — LOSARTAN POTASSIUM 50 MG/1
50 TABLET ORAL DAILY
Qty: 90 TABLET | Refills: 3 | Status: SHIPPED | OUTPATIENT
Start: 2023-06-29

## 2024-06-02 ENCOUNTER — HEALTH MAINTENANCE LETTER (OUTPATIENT)
Age: 59
End: 2024-06-02

## 2025-06-15 ENCOUNTER — HEALTH MAINTENANCE LETTER (OUTPATIENT)
Age: 60
End: 2025-06-15